# Patient Record
Sex: FEMALE | Race: WHITE | NOT HISPANIC OR LATINO | Employment: FULL TIME | URBAN - METROPOLITAN AREA
[De-identification: names, ages, dates, MRNs, and addresses within clinical notes are randomized per-mention and may not be internally consistent; named-entity substitution may affect disease eponyms.]

---
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
## 2018-01-05 ENCOUNTER — GENERIC CONVERSION - ENCOUNTER (OUTPATIENT)
Dept: OTHER | Facility: OTHER | Age: 18
End: 2018-01-05

## 2018-01-09 NOTE — PSYCH
Message  Message Free Text Note Form: received call back from guidance counselor-- spent a lot of time in her office yesterday-- has a lot of distorted thinking  feels she reaches out to friends, they do not know how to help her  they are overwhelmed  it is too much for shira-- feels dependent  claims she learned no coping skills there-- she and i both feel being in partial is the best place for her  she has already talked to mom about this  peers all have their own stuff  also not taking meds as prescribed  Active Problems    1  Abdominal pain, acute, right upper quadrant (789 01,338 19) (R10 11)   2  Acute abdominal pain in left upper quadrant (789 02,338 19) (R10 12)   3  Anxiety and depression (300 4) (F41 8)   4  Coccydynia (724 79) (M53 3)   5  Generalized anxiety disorder (300 02) (F41 1)   6  Insomnia (780 52) (G47 00)   7  Moderate mood disorder (296 90) (F39)   8  Overweight (278 02) (E66 3)   9  Panic attack (300 01) (F41 0)   10  Primary insomnia (307 42) (F51 01)    Current Meds   1  BusPIRone HCl - 5 MG Oral Tablet; TAKE 1 TABLET am, two at 1:00pm and two at   8:00pm;   Therapy: 64NML9489 to (Evaluate:03Fwq6970)  Requested for: 57YDD1164; Last   Rx:04Jun2015 Ordered   2  TraZODone HCl - 50 MG Oral Tablet; TAKE 1 TABLET AT BEDTIME; Therapy: 39ENZ7798 to (Evaluate:56Meo2628)  Requested for: 17KVU6734; Last   Rx:04Jan2016 Ordered   3  Venlafaxine HCl ER 37 5 MG Oral Capsule Extended Release 24 Hour; one P O  first   week, two P O  second week, three  P O  thereafter; Therapy: 89ZCF6348 to (Last QI:42SHU7867)  Requested for: 65DQM8739 Ordered    Allergies    1   No Known Drug Allergies    Signatures   Electronically signed by : Siddharth Thakkar LCSW; Apr 28 2016 10:07AM EST                       (Author)

## 2018-01-09 NOTE — PROGRESS NOTES
Assessment    1  Encounter for preventive health examination (V70 0) (Z00 00)   2  PPD screening test (V74 1) (Z11 1)    Plan  Anxiety and depression    · Abilify 5 MG Oral Tablet (ARIPiprazole); TAKE 1 TABLET DAILY  Anxiety and depression, Panic attack    · BusPIRone HCl - 5 MG Oral Tablet  Generalized anxiety disorder, Moderate mood disorder    · Venlafaxine HCl ER 37 5 MG Oral Capsule Extended Release 24 Hour  Health Maintenance    · Avoid alcoholic beverages ; Status:Complete;   Done: 70BDU6149   · Be sure your child gets at least 8 hours of sleep every night ; Status:Complete;   Done:  08YZF2740   · Begin or continue regular aerobic exercise  Gradually work up to at least 3 sessions of 30  minutes of exercise a week ; Status:Complete;   Done: 73DRT7948   · Brush your teeth freq1 and floss at least once a day ; Status:Complete;   Done:  22GNJ5010   · Make rules and consequences for behavior clear to your children ; Status:Complete;    Done: 05MIC8057   · Schedule an appointment in 48-72 hours to have your TB (tuberculin) skin test  interpreted by a trained healthcare provider ; Status:Complete;   Done: 10FUJ2109   · Use a sun block product with an SPF of 15 or more ; Status:Complete;   Done: 38NKA4290   · We recommend that you bring your body mass index down to 26 ; Status:Complete;    Done: 18EZC5257  Insomnia    · TraZODone HCl - 50 MG Oral Tablet  PPD screening test    · Follow-up visit in 2 days Evaluation and Treatment  Follow-up  Status: Complete  Done:  22VFJ8812   · Tubersol 5 UNIT/0 1ML Intradermal Solution    Discussion/Summary    Impression:   No elimination concerns  Growth concerns include body mass index of elevated  1500 calorie PPD  titers  Information discussed with patient  Continue COUNSELLING and BETTER CHOICES        Chief Complaint  pt here today for a PE for [place] [address] (form requires labs for titers)      History of Present Illness  HM, 12-18 years Female (Brief): Nicole Murguia presents today for routine health maintenance with her SELF   Social and birth history reviewed  Social History: She lives with her mother, 1 brothers and mother's boyfriend  Her parents are   mother has full custody  mom works outside the home  mother works as managerial    [address]: The child's health since the last visit is described as fair  Dental hygiene: Good  Immunization status: Up to date  Caregiver concerns:   Menses: Menstrual history:  age at menarche was 13  The cycles have been regular  Menstrual Problems: She does't track it  The patient is not on an oral contraceptive pill  Nutrition/Elimination:   Diet:  her current diet needs improvement:  No elimination issues are expressed  Sleep: hypersomnia  Behavior: The child's temperament is described as Pt is without parent today  Health Risks:   Childcare/School: 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  School performance has been excellent  Sports Participation Questions:   HPI: going to [place] with mom and her boyfriend, Carissa Sen and his family and the going to [facility] before coming back to [place] and then off to college      Review of Systems    Constitutional: feeling tired  Eyes: no itching of the eyes  ENT: no nosebleeds  Cardiovascular: no chest pain  Respiratory: no cough  Genitourinary: no dysmenorrhea  Musculoskeletal: no myalgias  Psychiatric: emotional problems and currently seeing a therapist  Had ER visit in March for increasing depressive symptoms:  ROS reported by the patient  ROS reviewed  Active Problems    1  Abdominal pain, acute, right upper quadrant (789 01,338 19) (R10 11)   2  Anxiety and depression (300 4) (F41 8)   3  Coccydynia (724 79) (M53 3)   4  Generalized anxiety disorder (300 02) (F41 1)   5  Insomnia (780 52) (G47 00)   6  Moderate mood disorder (296 90) (F39)   7  Need for hepatitis B screening test (V73 89) (Z11 59)   8   Overweight (278 02) (E66 3)   9  Panic disorder without agoraphobia with moderate panic attacks (300 01) (F41 0)   10  Primary insomnia (307 42) (F51 01)   11  Screening for [place] measles (V73 3) (Z11 59)   12  Screening for rubella (V73 3) (Z11 59)    Past Medical History    · History of A Fall (E888 9)   · History of Acute abdominal pain in left upper quadrant (072 58,878 62) (R10 12)   · History of Acute abdominal pain in left upper quadrant (789 02,338 19) (R10 12)   · Discussion of Acute upper respiratory infection (465 9) (J06 9)   · History of dehydration (V12 29) (Z86 39)   · History of low back pain (V13 59) (Z87 39)   · History of nausea and vomiting (V12 79) (Z87 898)   · History of Need for Menactra vaccination (V03 89) (Z23)   · History of Need for vaccination for DTP (V06 1) (Z23)   · History of Sprained Ribs (848 3)   · History of Vesicoureteral reflux (593 70) (N13 70)    Surgical History    · Denied: History of Recent Surgery    Family History  Mother    · Family history of Hyperlipidemia   · Family history of Hypothyroidism  Father    · No pertinent family history  Family History    · Family history of Diabetes Mellitus (V18 0)   · Family history of Thyroid Disorder (V18 19)    Social History    · Never A Smoker   · Never Drank Alcohol   · Other parent-child estrangement (V61 29) (R31 309)    Current Meds   1  BusPIRone HCl - 5 MG Oral Tablet; TAKE 1 TABLET am, two at 1:00pm and two at   8:00pm;   Therapy: 28QRF9921 to (Evaluate:27Ggc7169)  Requested for: 87NDJ3036; Last   Rx:04Jun2015 Ordered   2  TraZODone HCl - 50 MG Oral Tablet; TAKE 1 TABLET AT BEDTIME; Therapy: 97HMN7354 to (Evaluate:43Ewo2722)  Requested for: 58WBH1893; Last   Rx:04Jan2016 Ordered   3  Venlafaxine HCl ER 37 5 MG Oral Capsule Extended Release 24 Hour; one P O  first   week, two P O  second week, three  P O  thereafter; Therapy: 60QLG1486 to (Last OI:05QXY4011)  Requested for: 35JLJ0740 Ordered    Allergies    1   No Known Drug Allergies    Vitals   Recorded: 83Fbn1378 01:00PM Recorded: 98RMK1679 19:86KX   Systolic 463    Diastolic 68    Height  5 ft 7 in   Weight  210 lb    BMI Calculated  32 89   BSA Calculated  2 06     Physical Exam    Constitutional - General appearance: No acute distress, well appearing and well nourished  alert, well developed, obese and well hydrated  Eyes - Pupils and irises: Equal, round, reactive to light bilaterally  Ears, Nose, Mouth, and Throat - Nasal mucosa, septum, and turbinates: Normal, no edema or discharge  Oropharynx: Moist mucosa, normal tongue and tonsils without lesions  Pulmonary - Auscultation of lungs: Clear bilaterally  Cardiovascular - Auscultation of heart: Regular rate and rhythm, normal S1 and S2, no murmur  Peripheral vascular exam: Normal    Abdomen - Abdomen: Normal bowel sounds, soft, non-tender, no masses  Liver and spleen: No hepatomegaly or splenomegaly  Lymphatic - Palpation of lymph nodes in neck: No anterior or posterior cervical lymphadenopathy  Musculoskeletal - Gait and station: Normal gait  Range of motion: Normal  Muscle strength/tone: Normal    Neurologic - Sensation: Normal    Psychiatric - Orientation to person, place, and time: Normal  Mood and affect: Abnormal  Mood and Affect: constricted, flat and unemotional  ambivalent attitude about going off to college        Future Appointments    Date/Time Provider Specialty Site   08/02/2016 02:00 PM Lili Pena [address] [facility]   08/09/2016 05:00 PM Lili Pena [address] [facility]   08/15/2016 01:00 PM Lili Pena [address] [facility]   08/23/2016 05:00 PM Lili Pena, [address] [facility]   09/06/2016 05:00 PM Lili Pena [address] [facility]   09/20/2016 05:00 PM Lili Pena [address] [facility]   10/04/2016 05:00 PM Lili Pena [place]   10/18/2016 05:00 PM Gabi Pena [place]     Signatures   Electronically signed by : Coreen Jacobo DO; Jul 27 4699  4:53PM EST                       (Author)

## 2018-01-09 NOTE — PSYCH
Progress Note  Psychotherapy Provided [facility]: Individual Psychotherapy 50 minutes and Family Therapy provided today  Goals addressed in session:   1-3D: P: Pt was seen with her paramour for a Family Therapy session  Mario Whipple spoke further today about her feelings re: her lack of certainty with what to do with her future  She verbalized that she feels like she "made a mistake" disenrolling from college because "that is what 'everyone' keeps telling her "   A: Mario Whipple appears to be continuing to experience pressure from her mother to return to college despite not feeling ready to do so at this time  She presented as more overwhelmed and anxious today  P: Additional sessions will be scheduled to support her with the above  Pain Scale and Suicide Risk [facility]: Current Pain Assessment: no pain   Behavioral Health Treatment Plan [employer]: Diagnosis and Treatment Plan explained to patient, patient relates understanding diagnosis and is agreeable to Treatment Plan  Assessment    1  PTSD (post-traumatic stress disorder) (309 81) (F43 10)   2   Moderate mood disorder (296 90) (F39)    Signatures   Electronically signed by : Lis Cohen LCSW; Apr 18 2017 11:33AM EST                       (Author)

## 2018-01-10 NOTE — PSYCH
Progress Note  Psychotherapy Provided [facility]: Individual Psychotherapy 50 minutes provided today  Goals addressed in session:   1-3D: P: Pt was seen for Individual Therapy session  Mario Whipple spoke about her feelings re: her restored friendship with her ex-paramour following her decision to terminate their relationship Mario Whipple reported that she has returned (temporarily) to her job but continues to have difficulty getting out of bed if she is not scheduled to work  A: Mario Whipple is very upset at having learned that her mother is cheating on her 2rd  despite being in the middle of writing a book about the success of her marriage  She is devastated by this news, but not suicidal    P: Mario Whipple will continue to be seen weekly for therapy as she was unwilling to attend [facility]  Pain Scale and Suicide Risk [facility]: Current Pain Assessment: no pain   Current suicide risk is low   Behavioral Health Treatment Plan [facility]: Diagnosis and Treatment Plan explained to patient, patient relates understanding diagnosis and is agreeable to Treatment Plan  Assessment    1  PTSD (post-traumatic stress disorder) (309 81) (F43 10)   2   Moderate mood disorder (296 90) (F39)    Signatures   Electronically signed by : Lis Cohen LCSW; May 12 2017  4:12PM EST                       (Author)

## 2018-01-10 NOTE — PSYCH
Progress Note  Psychotherapy Provided [facility]: Individual Psychotherapy 50 minutes provided today  Goals addressed in session:   1-3D: P: Pt was seen for Individual Therapy session  Debbi Tejada spoke about her feelings re: her last session, her desire to emancipate financially from her parents, to move out of her grandparents home so as to decrease their stress levels and to make decisions about her future  A: Debbi Tejada appears to be frustrated by the pressure she is feeling from her mother to return to college despite not feeling ready to do so at this time  She presented as less hopeless and despondent today and was able to speak and think more clearly  P: Additional sessions will be scheduled to support her with the above  Pain Scale and Suicide Risk [facility]: Current Pain Assessment: no pain   Behavioral Health Treatment Plan [facility]: Diagnosis and Treatment Plan explained to patient, patient relates understanding diagnosis and is agreeable to Treatment Plan  Assessment    1  Moderate mood disorder (296 90) (F39)   2  Encounter for preventive health examination (V70 0) (Z00 00)   3   Panic disorder without agoraphobia with moderate panic attacks (300 01) (F41 0)    Signatures   Electronically signed by : [facility], [facility]; Apr 6 2017  1:42PM EST                       (Author)

## 2018-01-10 NOTE — PSYCH
Message  Message Free Text Note Form: returned call from pt --she stated that she is at work--"has not eaten in two days, has not slept in two days, has not urinated " Reported that she took 12 of mom's antidepressants with alcohol, informed friend who "watched her throughout the night  Pt denied suicidal intent  Was told she should go to ER and to call mom (who is out of town) Pt refused both, but will remain with friend for next 2 days  Active Problems    1  Chronic fatigue (780 79) (R53 82)   2  Dyshidrotic eczema (705 81) (L30 1)   3  Moderate mood disorder (296 90) (F39)   4  PPD screening test (V74 1) (Z11 1)   5  Primary insomnia (307 42) (F51 01)   6  PTSD (post-traumatic stress disorder) (309 81) (F43 10)   7  Screening for [place] measles (V73 3) (Z11 59)   8  Screening for rubella (V73 3) (Z11 59)    Current Meds   1  ARIPiprazole 5 MG Oral Tablet; take 1 tablet by mouth every day; Therapy: 43LER1738 to (Evaluate:22Jun2017)  Requested for: 13FQO9208; Last   Rx:36Ugy2424 Ordered   2  Hydrocortisone 2 5 % External Cream; APPLY SPARINGLY TO AFFECTED AREA(S)   TWICE DAILY; Therapy: 84Dcn1797 to (Evaluate:17Jun2017)  Requested for: 18Apr2017; Last   Rx:50Mdo4462 Ordered    Allergies    1   No Known Drug Allergies    Signatures   Electronically signed by : Paco Funk LCSW; Jun 16 2017 10:52AM EST                       (Author)

## 2018-01-10 NOTE — MISCELLANEOUS
Message  Message Free Text Note Form: P/C from [facility] ER  Pt with Hx of anxiety and depression recently D/C'd abilify  Overdose on welbutrin and ETOH yesterday  Not suicidal  Will be D/C'd home  Needs close f/u        Signatures   Electronically signed by : Kulwant Rodriguez DO; Jun 16 2017 11:03PM EST                       (Author)

## 2018-01-10 NOTE — PSYCH
Progress Note  Psychotherapy Provided [facility]: Group Therapy provided today  Goals addressed in session:   1 D: Pt attended Trauma Group  Introductions, group rules and expectations were reviewed as several new members attended  Pts engaged in activity today re: fear of change  A: Robinson Paniagua was quiet and observant today as this was her first time attending this group  She expressed that she could very much identify with the topic being discussed today as she has âlostâ all of the friends she had felt close to throughout high school  P: Pt will continue to be seen for individual and group therapy during which the above will be further processed  Behavioral Health Treatment Plan [address] [place] [address] [address]: Diagnosis and Treatment Plan explained to patient, patient relates understanding diagnosis and is agreeable to Treatment Plan  Assessment    1   Panic disorder without agoraphobia with moderate panic attacks (300 01) (F41 0)    Signatures   Electronically signed by : Dayanara Zaragoza LCSW; Jul 14 2016  8:44AM EST                       (Author)

## 2018-01-10 NOTE — PSYCH
Risk Assessment    The following ratings are based on my observation of this patient over the last interview  Risk of Harm to Self:   Demographic risk factors include , alaskan, or native [place] and age: young adult (15-24)  Recent Specific Risk Factors include: experienced persistent ideation, unable to visualize a realistic positive future, feelings of guilt or self blame, recent losses: friendships and diagnosis of depression  These risk factors presented within the last week  Additional Factors for a Child or Adolescent: strained family relationships/ or  parents  Risk of Harm to Others:   Based on the above information, the client presents the following risk of harm to self or others: moderate  The following interventions are recommended: referral to ER  Notes regarding this Risk Assessment: Pt was fairly non-verbal during today's session as mom's presence was requested by this worker  As a result of this worker's concerns, pt and family were sent directly to ER following this session for further evaluation  Assessment    1  Major depressive disorder, single episode (296 20) (F32 9)   2  Generalized anxiety disorder (300 02) (F41 1)    Active Problems    1  Abdominal pain, acute, right upper quadrant (789 01,338 19) (R10 11)   2  Acute abdominal pain in left upper quadrant (789 02,338 19) (R10 12)   3  Anxiety and depression (300 4) (F41 8)   4  Coccydynia (724 79) (M53 3)   5  Generalized anxiety disorder (300 02) (F41 1)   6  Insomnia (780 52) (G47 00)   7  Major depressive disorder, single episode (296 20) (F32 9)   8  Overweight (278 02) (E66 3)   9  Panic attack (300 01) (F41 0)   10  Primary insomnia (307 42) (F51 01)    Past Medical History    1  History of A Fall (E888 9)   2  Discussion of Acute upper respiratory infection (465 9) (J06 9)   3  History of dehydration (V12 29) (Z86 39)   4  History of low back pain (V13 59) (Z87 39)   5   History of nausea and vomiting (V12 79) (Z87 898)   6  History of Need for Menactra vaccination (V03 89) (Z23)   7  History of Need for vaccination for DTP (V06 1) (Z23)   8  History of Sprained Ribs (848 3)   9   History of Vesicoureteral reflux (593 70) (N13 70)    Future Appointments    Date/Time Provider Specialty Site   03/22/2016 08:00 AM Lili Pena [address] [facility]   [facility]12[facility]2016 03[facility]     Signatures   Electronically signed by : Renato Johnson LCSW; Mar 17 2016  2:01PM EST                       (Author)

## 2018-01-10 NOTE — PSYCH
Progress Note  Psychotherapy Provided [facility]: Individual Psychotherapy 50 minutes provided today  Goals addressed in session:   1-3D: P: Pt was seen for Individual Therapy session  Pt shared a letter she wrote to this worker about her feelings re: her trauma history and relationships with her friends and how they have continued to change as the end of high school approaches  She also shared texts from her father  A: Mary Morales appears to feel uncertain as to whether her trauma memories are "real" as they occurred during a very chaotic time in her childhood      P: Additional sessions were scheduled during which the above will continue to be addressed  Pain Scale and Suicide Risk [facility]: Current Pain Assessment: no pain   Current suicide risk is low   Behavioral Health Treatment Plan [facility]: Diagnosis and Treatment Plan explained to patient, patient relates understanding diagnosis and is agreeable to Treatment Plan  Assessment    1  Moderate mood disorder (296 90) (F39)   2   Acute abdominal pain in left upper quadrant (610 52,296 52) (R10 12)    Signatures   Electronically signed by : Kerin Carrel, LCSW; May 19 2016  9:01AM EST                       (Author)

## 2018-01-11 NOTE — PSYCH
Progress Note  Psychotherapy Provided [facility]: Individual Psychotherapy 50 minutes provided today  Goals addressed in session:   1-3D: P: Pt was seen for Individual Therapy session  Pt spoke about her feelings of despondency as she admitted to this worker that she realized this year she is hendrix and is attracted to her best friend  However, she does not believe she she could ever be out to her family, nor is this acceptable in her [address]  A: Phillip Pedroza struggles with not believing that she can ever be happy regardless of her sexuality or acceptance by others  This will continue to be the focus, topic of upcoming therapy sessions as she prepares for dpearture for college in 4 months  P: Additional sessions were scheduled during which the above will continue to be addressed  Pain Scale and Suicide Risk [facility]: Current Pain Assessment: no pain   Current suicide risk is low   Behavioral Health Treatment Plan [facility]: Diagnosis and Treatment Plan explained to patient, patient relates understanding diagnosis and is agreeable to Treatment Plan  Assessment    1  Moderate mood disorder (296 90) (F39)   2   Generalized anxiety disorder (300 02) (F41 1)    Signatures   Electronically signed by : Elena Lord LCSW; Apr 19 2016  2:57PM EST                       (Author)

## 2018-01-11 NOTE — PSYCH
Message  Message Free Text Note Form: returned call from mom - she is upset that Jordan Costa is refusing to take meds  Reported that her moods are "down for 4 days, up for one " Shared that daughter "overdosed" on pot brownies and pcp was contacted  Stated she has "cut her off financially," and will not send her to college in current state  However, admitted she is allowing her to leave following today's session to go [place] work friends [place] a [place]  Informed mom all would be discussed and additional sessions would be scheduled  Active Problems    1  Abdominal pain, acute, right upper quadrant (789 01,338 19) (R10 11)   2  Anxiety and depression (300 4) (F41 8)   3  Coccydynia (724 79) (M53 3)   4  Generalized anxiety disorder (300 02) (F41 1)   5  Insomnia (780 52) (G47 00)   6  Moderate mood disorder (296 90) (F39)   7  Overweight (278 02) (E66 3)   8  Panic disorder without agoraphobia with moderate panic attacks (300 01) (F41 0)   9  Primary insomnia (307 42) (F51 01)    Current Meds   1  BusPIRone HCl - 5 MG Oral Tablet; TAKE 1 TABLET am, two at 1:00pm and two at   8:00pm;   Therapy: 36IRY9380 to (Evaluate:90Cxu8658)  Requested for: 80PRU7460; Last   Rx:04Jun2015 Ordered   2  TraZODone HCl - 50 MG Oral Tablet; TAKE 1 TABLET AT BEDTIME; Therapy: 36BTP1375 to (Evaluate:38Bqe7217)  Requested for: 22EYO6081; Last   Rx:04Jan2016 Ordered   3  Venlafaxine HCl ER 37 5 MG Oral Capsule Extended Release 24 Hour; one P O  first   week, two P O  second week, three  P O  thereafter; Therapy: 59VOZ5369 to (Last NB:05OFE3174)  Requested for: 74VHE9751 Ordered    Allergies    1   No Known Drug Allergies    Signatures   Electronically signed by : Nova Arreola LCSW; Jul 13 2016  9:59AM EST                       (Author)

## 2018-01-11 NOTE — PSYCH
Progress Note  Psychotherapy Provided [facility]: Individual Psychotherapy 50 minutes provided today  Goals addressed in session:   1-3D: P: Pt was seen for Individual Therapy session  Pt spoke about her more today re: her feelings re: her relationships with her friends and how they have continued to change as the end of high school approaches  She also shared the fears she has about her future  A: Jessica Rowell appears to working at, although still struggling to stay in the present without overthinking situations negatively Her friends appear to recognize the attempts she is making to change  P: Additional sessions were scheduled during which the above will continue to be addressed  Pain Scale and Suicide Risk [facility]: Current Pain Assessment: no pain   Current suicide risk is low   Behavioral Health Treatment Plan [facility]: Diagnosis and Treatment Plan explained to patient, patient relates understanding diagnosis and is agreeable to Treatment Plan  Assessment    1  Moderate mood disorder (296 90) (F39)   2   Generalized anxiety disorder (300 02) (F41 1)    Signatures   Electronically signed by : Mecca Quintero LCSW; May 24 2016 12:28PM EST                       (Author)

## 2018-01-11 NOTE — PSYCH
Message  Message Free Text Note Form: Mom cb after receiving my msg-- feels like Lita Thorpe exaggerates things that have happened in her life-- that they had a great weekend, she will NOT tell her family anything! Mom asks her ALL the time if she is going to hurt herself - she says she would NEVER do that-- puts a tracker in her phone and she RIPS it out-- Jewel Underwood told her to NOT take her car  Mom asked --should she go to a hospital? Yes, I do think she should be evaluated-- she will discuss with her parents    Is this a cry for attention? If it is, there is a NEED for attention  Offered to see family in my emergency appt on Thurs  Mom has to check her schedule  Sofia Chapin Mom will supervise Dara at all times, will take her car tfn  WCB      Active Problems    1  Abdominal pain, acute, right upper quadrant (789 01,338 19) (R10 11)   2  Acute abdominal pain in left upper quadrant (789 02,338 19) (R10 12)   3  Anxiety and depression (300 4) (F41 8)   4  Coccydynia (724 79) (M53 3)   5  Generalized anxiety disorder (300 02) (F41 1)   6  Insomnia (780 52) (G47 00)   7  Major depressive disorder, single episode (296 20) (F32 9)   8  Overweight (278 02) (E66 3)   9  Panic attack (300 01) (F41 0)   10  Primary insomnia (307 42) (F51 01)    Current Meds   1  BusPIRone HCl - 5 MG Oral Tablet; TAKE 1 TABLET am, two at 1:00pm and two at   8:00pm;   Therapy: 78LKP9151 to (Evaluate:28Sin1115)  Requested for: 21EZM6473; Last   Rx:04Jun2015 Ordered   2  TraZODone HCl - 50 MG Oral Tablet; TAKE 1 TABLET AT BEDTIME; Therapy: 69KCO4766 to (Evaluate:00Yhm4584)  Requested for: 26FOL6615; Last   Rx:04Jan2016 Ordered   3  Venlafaxine HCl ER 37 5 MG Oral Capsule Extended Release 24 Hour; one P O  first   week, two P O  second week, three  P O  thereafter; Therapy: 54PEY0471 to (Last SI:21WJX9775)  Requested for: 61ZAK7748 Ordered    Allergies    1   No Known Drug Allergies    Signatures   Electronically signed by : Yoni Burnett LCSW; Mar 15 2016  2:11PM EST                       (Author)

## 2018-01-12 NOTE — PSYCH
Progress Note  Psychotherapy Provided [facility]: Individual Psychotherapy 50 minutes provided today  Goals addressed in session:   1-3D: P: Pt was seen for Individual Therapy session  Adriana Mills spoke much more about her feelings re: her restored friendship with her ex-paramour  Adriana Mills reported that she is aware that this girl "is not hendrix," and would prefer her to be "a male " This was explored at length, as was her father's decision to hire a  for her  A: Adriana Mills was receptive to support and feedback today as she spoke of the above  She has enrolled at [employer] for the Fall, and 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  P: Adriana Mills will continue to be seen weekly for therapy as she was unwilling to attend [facility]  Pain Scale and Suicide Risk [facility]: Current Pain Assessment: no pain   Current suicide risk is low   Behavioral Health Treatment Plan [facility]: Diagnosis and Treatment Plan explained to patient, patient relates understanding diagnosis and is agreeable to Treatment Plan  Assessment    1  PTSD (post-traumatic stress disorder) (309 81) (F43 10)   2   Moderate mood disorder (296 90) (F39)    Signatures   Electronically signed by : Lindon Caller, LCSW; Jun 6 2017  9:58AM EST                       (Author)

## 2018-01-12 NOTE — PSYCH
Progress Note  Psychotherapy Provided [facility]: Family Therapy provided today  Goals addressed in session:   1-3D: P: Pt was seen for Family Therapy session  Pt spoke more about her feelings re: the loss of support from her two best friends over the past several months  Her family history of invalidation was also briefly discussed  This all occurred in the presence of on of her best friends  A: Pt was very open today  She admitted that she is very hard on herself and does not belief that it is acceptable that her trauma history should impact her ability to have healthy relationships  P: Additional sessions were scheduled during which the above will begin to be addressed  Behavioral Health Treatment Plan [facility]: Diagnosis and Treatment Plan explained to patient, patient relates understanding diagnosis and is agreeable to Treatment Plan  Assessment    1  Clinical depression (311) (F32 9)   2  Generalized anxiety disorder (300 02) (F41 1)   3   Panic attack (300 01) (F41 0)    Signatures   Electronically signed by : Lis Cohen LCSW; Feb 26 2016 11:50AM EST                       (Author)

## 2018-01-12 NOTE — PSYCH
Progress Note  Psychotherapy Provided [facility]: Individual Psychotherapy 50 minutes provided today  Goals addressed in session:   1-3D: P: Pt was seen for Individual Therapy session  Pt spoke more about her feelings re: the loss of support from her two best friends over the past several months  Her family history of invalidation was also briefly discussed  A: Pt was very open today  She admitted she became overwhelmed on Friday and punched a hole in her wall  Her mother was unsupportive of her sadness  P: Additional sessions were scheduled during which the above will begin to be addressed  Behavioral Health Treatment Plan [facility]: Diagnosis and Treatment Plan explained to patient, patient relates understanding diagnosis and is agreeable to Treatment Plan  Assessment    1  Generalized anxiety disorder (300 02) (F41 1)   2  Panic attack (300 01) (F41 0)   3   Clinical depression (311) (F32 9)    Signatures   Electronically signed by : Elena Lord LCSW; Feb 18 2016  9:32AM EST                       (Author)

## 2018-01-12 NOTE — PSYCH
Date of Initial Treatment Plan: 2/5/2016  Date of Current Treatment Plan: 3/2/17  Treatment Plan 4  Strengths/Personal Resources for Self Care: I am productive, an achiever, knowledgeable, a leader, a , forgiving, understanding, compassionate, accomplished, analytical, tolerant, unselfish, naive, loyal, funny, intense, changeable, intelligent, a facilitator, open-minded, sensitive, sincere, self-reliant, a good listener, and perceptive  Diagnosis:   Axis I: MDD     Area of Needs: I don't want to be underneath my family's thumb  I believe that if I don't have control over everything, I don't have control over anything  I don't know what I want to do with my life  Long Term Goals:   I want to commit to goals and follow through on them  (I want to be more consistent)   Target Date: 7/2/17      I want to be independent  Target Date: 7/2/17      I want to be strong enough to handle bad situations without becoming paralyzed by them   Target Date: 7/2/17    Short Term Objectives:   Goal 1:   I will work on my past    Target Date: 6/2/17      Goal 2:   I will focus more on myself and less on others  I will set short term, obtainable goals  Target Date: 6/2/17      Goal 3:   I will learn to challenge my thinking  Target Date: 7/2/17      GOAL 1: Modality: Individual 2 x per month Target Date: 7/2/17       The person(s) responsible for carrying out the plan is Jordan Costa  GOAL 2: Modality: Individual 2 x per month Target Date: 7/2/17       The person(s) responsible for carrying out the plan is Jordan Costa  GOAL 3: Modality: Individual 2 x per month Target Date: 7/2/17         The person(s) responsible for carrying out the plan is Jordan Costa  The first scheduled review date is 7/2/17  The expected length of service is 6 mos               CLIENT COMMENTS / Please share your thoughts, feelings, need and/or experiences regarding your treatment plan: _____________________________________________________________________________________________________________________________________________________________________________________________________________________________________________________________________________________________________________________ Date/Time: ______________     Patient Signature: _________________________________ Date/Time: ______________        1  Abdominal pain, acute, right upper quadrant (789 01,338 19) (R10 11)   2  Anxiety and depression (300 4) (F41 8)   3  Coccydynia (724 79) (M53 3)   4  Generalized anxiety disorder (300 02) (F41 1)   5  Insomnia (780 52) (G47 00)   6  Moderate mood disorder (296 90) (F39)   7  Need for hepatitis B screening test (V73 89) (Z11 59)   8  Overweight (278 02) (E66 3)   9  Panic disorder without agoraphobia with moderate panic attacks (300 01) (F41 0)   10  PPD screening test (V74 1) (Z11 1)   11  Primary insomnia (307 42) (F51 01)   12  Screening for [place] measles (V73 3) (Z11 59)   13   Screening for rubella (V73 3) (Z11 59)     Electronically signed by : Ricki Salcedo LCSW; Mar  2 2017  3:40PM EST                       (Author)

## 2018-01-12 NOTE — PSYCH
Progress Note  Psychotherapy Provided [facility]: Individual Psychotherapy 50 minutes provided today  Goals addressed in session:   1-3D: P: Pt was seen for Individual Therapy session  Felipe Portillo reported that she has returned home permanently from college as she became overwhelmed by her struggles in completing assignments  She shared how negatively her family has been about this decision resulting in her moving in with her grandmother  A: Felipe Portillo appears to be very concerned that people may think she returned home to "be with her girlfriend" and she is extremely upset about this  She does not "know what she wants to do with her life" and "needs assistance figuring this out "   P: Additional sessions will be scheduled to support her with the above  Pain Scale and Suicide Risk [facility]: Current Pain Assessment: no pain   Behavioral Health Treatment Plan [address] [place] [address] [address]: Diagnosis and Treatment Plan explained to patient, patient relates understanding diagnosis and is agreeable to Treatment Plan  Results/Data  PHQ-9 Adult Depression Screening [id number] 10:05AM [place]     Test Name Result Flag Reference   PHQ-9 Adult Depression Score 10     Over the last two weeks, how often have you been bothered by any of the following problems? Little interest or pleasure in doing things: Several days - 1  Feeling down, depressed, or hopeless: Not at all - 0  Trouble falling or staying asleep, or sleeping too much: More than half the days - 2  Feeling tired or having little energy: Nearly every day - 3  Poor appetite or over eating: Several days - 1  Feeling bad about yourself - or that you are a failure or have let yourself or your family down: Nearly every day - 3  Trouble concentrating on things, such as reading the newspaper or watching television: Not at all - 0  Moving or speaking so slowly that other people could have noticed   Or the opposite -  being so fidgety or restless that you have been moving around a lot more than usual: Not at all - 0  Thoughts that you would be better off dead, or of hurting yourself in some way: Not at all - 0   PHQ-9 Adult Depression Screening Negative     PHQ-9 Difficulty Level Somewhat difficult     PHQ-9 Severity Moderate Depression         Assessment    1  Generalized anxiety disorder (300 02) (F41 1)   2  Moderate mood disorder (296 90) (F39)   3   Panic disorder without agoraphobia with moderate panic attacks (300 01) (F41 0)    Signatures   Electronically signed by : Mita Rodgers LCSW; Mar  3 2017 11:04AM EST                       (Author)
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
## 2018-01-13 NOTE — PSYCH
Progress Note  Psychotherapy Provided [facility]: Individual Psychotherapy 50 minutes provided today  Goals addressed in session:   1-3D: P: Pt was seen for Individual Therapy session  Pt spoke today re: her feelings re: her plans to leave for college in a few weeks andd ongoing belief that her trauma and depressive symptoms are "all in her head" as that is what she has always been told by her father  A: Robinson Paniagua appears to benefit from her reltaionship with this worker and is not looking forward to leaving for school  She was encouraged to share these feelings with her mother  P: Additional sessions were scheduled during which the above will continue to be addressed  Pain Scale and Suicide Risk [facility]: Current Pain Assessment: no pain   Current suicide risk is low   Behavioral Health Treatment Plan [facility]: Diagnosis and Treatment Plan explained to patient, patient relates understanding diagnosis and is agreeable to Treatment Plan  Assessment    1  Generalized anxiety disorder (300 02) (F41 1)   2  Moderate mood disorder (296 90) (F39)   3  Panic disorder without agoraphobia with moderate panic attacks (300 01) (F41 0)   4   Overweight (278 02) (E66 3)    Signatures   Electronically signed by : Dayanara Zaragoza LCSW; Jul 26 2016 11:58AM EST                       (Author)

## 2018-01-13 NOTE — PSYCH
Message  Message Free Text Note Form: called dr Montalvo Organ re: pt-- expressed concern for her wellbeing--  has had recent contact with her and will reach out to pt      called pt to request that she reach out to   --she fwd call to  4x  Active Problems    1  Chronic fatigue (780 79) (R53 82)   2  Dyshidrotic eczema (705 81) (L30 1)   3  Moderate mood disorder (296 90) (F39)   4  PPD screening test (V74 1) (Z11 1)   5  Primary insomnia (307 42) (F51 01)   6  PTSD (post-traumatic stress disorder) (309 81) (F43 10)   7  Screening for [place] measles (V73 3) (Z11 59)   8  Screening for rubella (V73 3) (Z11 59)    Current Meds   1  ARIPiprazole 5 MG Oral Tablet; take 1 tablet by mouth every day; Therapy: 17CVH3240 to (Evaluate:22Jun2017)  Requested for: 38QBA5060; Last   Rx:11Qsy3459 Ordered   2  Hydrocortisone 2 5 % External Cream; APPLY SPARINGLY TO AFFECTED AREA(S)   TWICE DAILY; Therapy: 87Wlx3000 to (Evaluate:17Jun2017)  Requested for: 79Msw2450; Last   Rx:08Umv3593 Ordered    Allergies    1   No Known Drug Allergies    Signatures   Electronically signed by : Oj Alfaro LCSW; Jun 16 2017  5:06PM EST                       (Author)

## 2018-01-14 VITALS
DIASTOLIC BLOOD PRESSURE: 70 MMHG | WEIGHT: 232.38 LBS | HEIGHT: 67 IN | BODY MASS INDEX: 36.47 KG/M2 | SYSTOLIC BLOOD PRESSURE: 118 MMHG

## 2018-01-14 VITALS
WEIGHT: 222.13 LBS | HEIGHT: 67 IN | SYSTOLIC BLOOD PRESSURE: 112 MMHG | DIASTOLIC BLOOD PRESSURE: 68 MMHG | BODY MASS INDEX: 34.87 KG/M2

## 2018-01-14 NOTE — PSYCH
1  Major depressive disorder, single episode (296 20) (F32 9)   2  Generalized anxiety disorder (300 02) (F41 1)      Date of Initial Treatment Plan: 2/5/2016  Date of Current Treatment Plan: 3/14/2016  Treatment Plan 2  Strengths/Personal Resources for Self Care: I am productive, an achiever, knowledgeable, a leader, a , forgiving, understanding, compassionate, accomplished, analytical, tolerant, unselfish, naive, loyal, funny, intense, changeable, intelligent, a facilitator, open-minded, sensitive, sincere, self-reliant, a good listener, and perceptive  Diagnosis:   Axis I: MDD     Current Challenges/Problems/Needs: I over think everything  I dwell on things- I struggle with committing to things- it scares me  I am my toughest critic  Nothing is ever good enough  I am a big time perfectionist  I don't / can't trust be  Long Term Goals:   I want to be more trusting  Target Date: 6/5/2016      I want to be accept as myself so I can be accepted by other people  Target Date: 6/5/2016      I want my happiness to be dependent on me   Target Date: 6/5/2016      Short Term Objectives:   Goal 1:   I will take people's word --initially at face value instead of overthinking their intentions  I will talk in therapy about my current struggles to trust    Target Date: 5/5/2016      Goal 2:   I will practice expressing myself to people more openly  Target Date: 5/5/2016      Goal 3:   I will do thought stopping   I will do yoga  I will set and follow through with daily goals  I will learn to challenge my thinking  Target Date: 5/5/2016      GOAL 1: Modality: Individual 2 x per month Target Date: 6/5/2016         GOAL 2: Modality: Individual 2 x per month Target Date: 6/5/2016         GOAL 3: Modality: Individual 2 x per month Target Date: 6/5/2016         The first scheduled review date is 6/5/2016  The expected length of service is 6 mos                      CLIENT COMMENTS / Please share your thoughts, feelings, need and/or experiences regarding your treatment plan: _____________________________________________________________________________________________________________________________________________________________________________________________________________________________________________________________________________________________________________________ Date/Time: ______________     Patient Signature: _________________________________ Date/Time: ______________        1  Abdominal pain, acute, right upper quadrant (789 01,338 19) (R10 11)   2  Acute abdominal pain in left upper quadrant (789 02,338 19) (R10 12)   3  Anxiety and depression (300 4) (F41 8)   4  Coccydynia (724 79) (M53 3)   5  Generalized anxiety disorder (300 02) (F41 1)   6  Insomnia (780 52) (G47 00)   7  Major depressive disorder, single episode (296 20) (F32 9)   8  Overweight (278 02) (E66 3)   9  Panic attack (300 01) (F41 0)   10   Primary insomnia (307 42) (F51 01)     Electronically signed by : Linda Pruitt LCSW; Mar 14 2016  3:53PM EST                       (Author)

## 2018-01-14 NOTE — PSYCH
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, but has struggled less with depression and mood swings  She is receiving therapy regularly at school and benefitting from doing so  P: Additional sessions will be scheduled during semester breaks  Pain Scale and Suicide Risk [facility]: Current Pain Assessment: no pain   Behavioral Health Treatment Plan [facility]: Diagnosis and Treatment Plan explained to patient, patient relates understanding diagnosis and is agreeable to Treatment Plan  Assessment    1  Generalized anxiety disorder (300 02) (F41 1)   2  Panic disorder without agoraphobia with moderate panic attacks (300 01) (F41 0)   3   Moderate mood disorder (296 90) (F39)    Signatures   Electronically signed by : Eryn Joyce LCSW; Feb 13 2017  1:02PM EST                       (Author)    Electronically signed by : Gordo Gale; Feb 13 2017  1:02PM EST                       (Author)

## 2018-01-14 NOTE — PSYCH
Progress Note  Psychotherapy Provided [facility]: Individual Psychotherapy 50 minutes provided today  Goals addressed in session:   1-3D: P: Pt was seen for Individual Therapy session  Treatment Plan was developed  A: Pt was very open and insightful despite initial hesitancy to attend, participate  P: Additional sessions were scheduled during which the goals will begin to be addressed  Assessment    1  Clinical depression (311) (F32 9)   2   Generalized anxiety disorder (300 02) (F41 1)    Signatures   Electronically signed by : Kori Woody LCSW; Feb 5 2016  9:57AM EST                       (Author)

## 2018-01-14 NOTE — PSYCH
Progress Note  Psychotherapy Provided [facility]: Individual Psychotherapy 50 minutes provided today  Goals addressed in session:   1-3D: P: Pt was seen for Individual Therapy session  Pt spoke about her feelings re: having begun to open up to family members about her sexuality  She also shared her desire to share with her friends, but stated uncertainty as to whether to do so prior to the end of the school year  A: Phillip Pedroza appears to feel fairly supported by her mother at this time  She continues to rely heavily on the reactions and statements of others to determine her own emotional well-being, however  P: Additional sessions were scheduled during which the above will continue to be addressed  Pain Scale and Suicide Risk [facility]: Current Pain Assessment: no pain   Current suicide risk is low   Behavioral Health Treatment Plan [facility]: Diagnosis and Treatment Plan explained to patient, patient relates understanding diagnosis and is agreeable to Treatment Plan  Assessment    1  Moderate mood disorder (296 90) (F39)   2   Generalized anxiety disorder (300 02) (F41 1)    Signatures   Electronically signed by : Elena Lord LCSW; May  4 2016 10:26AM EST                       (Author)

## 2018-01-15 NOTE — PSYCH
Progress Note  Psychotherapy Provided [facility]: Individual Psychotherapy 50 minutes provided today  Goals addressed in session:   1-3D: P: Pt was seen for Individual Therapy session  Thais Christian reluctantly spoke about her feelings re: the termination of all contact with her ex-paramour at this time  Thais Christian reported that she is home alone at present as her family is out of town  She shared texts between herself and her father and reported that she was hurt that he got rid of her bed which indicated to her he had "no place for her"   A: Thais Christian struggles with relationships and appears to sabotage relationships with anyone who attempts to get close to her  She denied suicidality today when questioned by this worker but did acknowledge marijuana use  P: Thais Christian remains unwilling to attend [facility] and has no sessions scheduled at this time as 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 but will remain on this worker's cancellation list    Pain Scale and Suicide Risk [facility]: Current Pain Assessment: no pain   Current suicide risk is low   Behavioral Health Treatment Plan [facility]: Diagnosis and Treatment Plan explained to patient, patient relates understanding diagnosis and is agreeable to Treatment Plan  Assessment    1  PTSD (post-traumatic stress disorder) (309 81) (F43 10)   2   Moderate mood disorder (296 90) (F39)    Signatures   Electronically signed by : Mita Rodgers LCSW; Justen 15 2017 10:11AM EST                       (Author)    Electronically signed by : Mita Rodgers LCSW; Justen 15 2017 11:05AM EST                       (Author)

## 2018-01-15 NOTE — PSYCH
Risk Assessment    The following ratings are based on my observation of this patient over the last session  Risk of Harm to Self:   Demographic risk factors include , alaskan, or native [place] and age: young adult (15-24)  Recent Specific Risk Factors include: experienced fleeting ideation, sense of hopelessness/helplessness, unable to visualize a realistic positive future and diagnosis of depression  These risk factors presented within the last week  Additional Factors for a Child or Adolescent: gender: female (more likely to attempt) and strained family relationships/ or  parents  Risk of Harm to Others:   Recent Specific Risk Factors include: concomitant mood or thought disorder  Access to Weapons: The patient has access to the following weapons: car  Based on the above information, the client presents the following risk of harm to self or others: moderate  The following interventions are recommended: consultation with follow up session within 24 hours  Notes regarding this Risk Assessment: Pt denied urges, intent, and plan  Pt was encouraged to bring mom (although unsupportive of therapy and in denial of daughter's need for treatment) to tomorrow's session  Assessment    1  Major depressive disorder, single episode (296 20) (F32 9)   2  Generalized anxiety disorder (300 02) (F41 1)    Active Problems    1  Abdominal pain, acute, right upper quadrant (789 01,338 19) (R10 11)   2  Acute abdominal pain in left upper quadrant (789 02,338 19) (R10 12)   3  Anxiety and depression (300 4) (F41 8)   4  Coccydynia (724 79) (M53 3)   5  Generalized anxiety disorder (300 02) (F41 1)   6  Insomnia (780 52) (G47 00)   7  Major depressive disorder, single episode (296 20) (F32 9)   8  Overweight (278 02) (E66 3)   9  Panic attack (300 01) (F41 0)   10  Primary insomnia (307 42) (F51 01)    Past Medical History    1  History of A Fall (E888 9)   2   Discussion of Acute upper respiratory infection (465 9) (J06 9)   3  History of dehydration (V12 29) (Z86 39)   4  History of low back pain (V13 59) (Z87 39)   5  History of nausea and vomiting (V12 79) (Z87 898)   6  History of Need for Menactra vaccination (V03 89) (Z23)   7  History of Need for vaccination for DTP (V06 1) (Z23)   8  History of Sprained Ribs (848 3)   9   History of Vesicoureteral reflux (593 70) (N13 70)    Future Appointments    Date/Time Provider Specialty Site   03/15/2016 02:00 PM Lili Pena [address] [facility]   03/22/2016 08:00 AM Lili Pena [address] [facility]   04/12/2016 03:00 PM Lili Pena [facility]     Signatures   Electronically signed by : Renato Johnson LCSW; Mar 14 2016  3:05PM EST                       (Author)

## 2018-01-15 NOTE — PSYCH
Progress Note  Psychotherapy Provided [facility]: Individual Psychotherapy 50 minutes provided today  Goals addressed in session:   1-3D: P: Pt was seen for Individual Therapy session  Pt spoke about her feelings of confusion re: her sexuality  She also shared texts from her father and best friend demonstrating their lack of support, understanding of what she is going through  A: Sallie Bonilla appears to feel very supported by her mother at this time  APHQ-9 was completed and a considerable decrease in score was noted today  P: Additional sessions were scheduled during which the above will continue to be addressed  Pain Scale and Suicide Risk [facility]: Current Pain Assessment: no pain   Current suicide risk is low   Behavioral Health Treatment Plan [facility]: Diagnosis and Treatment Plan explained to patient, patient relates understanding diagnosis and is agreeable to Treatment Plan  Results/Data  Encounter Results   PHQ-9 Adolescent Depression Screening 22Apr2016 01:55PM Kavita Longoria     Test Name Result Flag Reference   PHQ-9 Adolescent Depression Score 10     Q1: 1, Q2: 2, Q3: 0, Q4: 1, Q5: 0, Q6: 2, Q7: 1, Q8: 2, Q9: 1   PHQ-9 Adolescent Depression Screening Negative     PHQ-9 Difficulty Level Somewhat difficult     PHQ-9 Severity Moderate Depression         Assessment    1  Generalized anxiety disorder (300 02) (F41 1)   2   Moderate mood disorder (296 90) (F39)    Signatures   Electronically signed by : Monica Talbert LCSW; Apr 22 2016  2:51PM EST                       (Author)

## 2018-01-15 NOTE — PSYCH
Progress Note  Psychotherapy Provided [facility]: Individual Psychotherapy 50 minutes provided today  Goals addressed in session:   1-3D: P: Pt was seen for Individual Therapy session  Pt spoke more today re: her feelings re: her relationships with her friends and how they have continued to change as the end of high school has now passed  She also shared the fears she has as she has revealed her sexuality to them  A: Janae Kothari appears to be most upset that her mother was initially supportive of her coming out, but has "pulled back" her support at this time Her friends appear to have continued to remain self - involved which is upsetting to Janae Kothari  P: Additional sessions were scheduled during which the above will continue to be addressed  Pain Scale and Suicide Risk [facility]: Current Pain Assessment: no pain   Current suicide risk is low   Behavioral Health Treatment Plan [facility]: Diagnosis and Treatment Plan explained to patient, patient relates understanding diagnosis and is agreeable to Treatment Plan  Assessment    1  Panic disorder without agoraphobia with moderate panic attacks (300 01) (F41 0)   2   Moderate mood disorder (296 90) (F39)    Signatures   Electronically signed by : Rajiv Gipson LCSW; Jun 8 2016 10:55AM EST                       (Author)

## 2018-01-15 NOTE — PSYCH
Progress Note  Psychotherapy Provided [facility]: Individual Psychotherapy 50 minutes provided today  Goals addressed in session:   1-3D: P: Pt was seen for Individual Therapy session  Pt spoke about her feelings of depression and loneliness today particularly as her her two best friends have developed new friendships during this school year  over the past several months  Her family history of invalidation was also further discussed  A: Pt continues to report that her mother would be unsupportive of her sadness if she were aware, so she does not share with her  PHQ-9 and Risk Assessment were completed  P: Additional sessions were scheduled during which the above will begin to be addressed  Pain Scale and Suicide Risk [facility]: Current Pain Assessment: no pain   Current suicide risk is low   Behavioral Health Treatment Plan [address] [place] [address] [address]: Diagnosis and Treatment Plan explained to patient, patient relates understanding diagnosis and is agreeable to Treatment Plan  Results/Data  Encounter Results   PHQ-9 Adolescent Depression Screening [id number] 03:06PM Ainsley Barker     Test Name Result Flag Reference   PHQ-9 Adolescent Depression Score 19     Q1: 2, Q2: 3, Q3: 3, Q4: 3, Q5: 1, Q6: 3, Q7: 2, Q8: 1, Q9: 1   PHQ-9 Adolescent Depression Screening Positive     PHQ-9 Difficulty Level Extremely difficult     PHQ-9 Severity      Moderately Severe Depression       Assessment    1  Major depressive disorder, single episode (296 20) (F32 9)   2   Generalized anxiety disorder (300 02) (F41 1)    Signatures   Electronically signed by : Eduardo Erwin LCSW; Mar 15 2016 10:54AM EST                       (Author)    Electronically signed by : Eduardo Erwin LCSW; Mar 16 2016 10:08AM EST                       (Author)

## 2018-01-15 NOTE — PSYCH
Risk Assessment    The following ratings are based on my observation of this patient over the last day and interview(s) with parents  Risk of Harm to Self:   Demographic risk factors include , alaskan, or native [place] and age: young adult (15-24)  Recent Specific Risk Factors include: experienced fleeting ideation, sense of hopelessness/helplessness, unable to visualize a realistic positive future, feelings of guilt or self blame and diagnosis of depression  These risk factors presented within the last month  Additional Factors for a Child or Adolescent: gender: female (more likely to attempt), age over 13, outsider among peers/being bullied and sexuality concern  Risk of Harm to Others:   Recent Specific Risk Factors include: multiple stressors  Based on the above information, the client presents the following risk of harm to self or others: low  The following interventions are recommended: referral to pcp for change in dosage of antidepressant  Notes regarding this Risk Assessment: pt denied intent, plan  Active Problems    1  Abdominal pain, acute, right upper quadrant (789 01,338 19) (R10 11)   2  Acute abdominal pain in left upper quadrant (789 02,338 19) (R10 12)   3  Anxiety and depression (300 4) (F41 8)   4  Coccydynia (724 79) (M53 3)   5  Generalized anxiety disorder (300 02) (F41 1)   6  Insomnia (780 52) (G47 00)   7  Moderate mood disorder (296 90) (F39)   8  Overweight (278 02) (E66 3)   9  Panic attack (300 01) (F41 0)   10  Primary insomnia (307 42) (F51 01)    Past Medical History    1  History of A Fall (E888 9)   2  Discussion of Acute upper respiratory infection (465 9) (J06 9)   3  History of dehydration (V12 29) (Z86 39)   4  History of low back pain (V13 59) (Z87 39)   5  History of nausea and vomiting (V12 79) (Z87 898)   6  History of Need for Menactra vaccination (V03 89) (Z23)   7  History of Need for vaccination for DTP (V06 1) (Z23)   8   History of Sprained Ribs (848 3)   9   History of Vesicoureteral reflux (593 70) (N13 70)    Future Appointments    Date/Time Provider Specialty Site   [facility]   05/12/2016 03:00 PM Bela Pena [place]   05/19/2016 02:00 [facility]   06/06/2016 08:00 AM Bela Pena [facility] [address] [facility]   06/13/2016 08:00 AM Bela Pena [address] [facility]   06/20/2016 08:00 AM Bela Villar [facility]     Signatures   Electronically signed by : Mita Rodgers LCSW; Apr 28 2016 10:17AM EST                       (Author)

## 2018-01-16 NOTE — PSYCH
Progress Note  Psychotherapy Provided [facility]: Individual Psychotherapy 50 minutes provided today  Goals addressed in session:   1-3D: P: Pt was seen for Individual Therapy session  Td Farrar returned for an impromptu therapy session today to discuss her extremely positive adjustment to college and to review the completion of her previously assigned homework  A: Td Farrar has re-developed a closer relationship with her family, her tyrese, and has built new friendships since starting college  She has been taking her medication as prescribed and is engaging in yoga, daily exercise  She continues to experience mood swings, but denies any suicidality or substance use  P: Additional sessions will be scheduled during semester breaks  Pain Scale and Suicide Risk [facility]: Current Pain Assessment: no pain   Behavioral Health Treatment Plan [facility]: Diagnosis and Treatment Plan explained to patient, patient relates understanding diagnosis and is agreeable to Treatment Plan  Assessment    1  Moderate mood disorder (296 90) (F39)   2   Panic disorder without agoraphobia with moderate panic attacks (300 01) (F41 0)    Signatures   Electronically signed by : Charo Bueno LCSW; Sep 16 2016 10:00AM EST                       (Author)

## 2018-01-16 NOTE — PSYCH
Progress Note  Psychotherapy Provided [facility]: Individual Psychotherapy 50 minutes provided today  Goals addressed in session:   1-3D: P: Pt was seen for Individual Therapy session  Praveen Benítez spoke about her feelings re: her paramour's decision to terminate their relationship and the impact this is having on her ability to function  Praveen Benítez reported that she quit her job and is having difficulty getting out of bed  A: Praveen Benítez acknowledges passive death wishes but no intent or plan  She feels considerable pressure (witnessed by this worker) to "get over it" and return to normal functioning (which she has not had in two years)  P: Her mom was contacted and a referral to [facility] will be initiated  Pain Scale and Suicide Risk [facility]: Current Pain Assessment: no pain   Current suicide risk is low   Behavioral Health Treatment Plan [facility]: Diagnosis and Treatment Plan explained to patient, patient relates understanding diagnosis and is agreeable to Treatment Plan  Assessment    1  PTSD (post-traumatic stress disorder) (309 81) (F43 10)   2   Moderate mood disorder (296 90) (F39)    Signatures   Electronically signed by : Paco Funk LCSW; Apr 28 2017 12:46PM EST                       (Author)

## 2018-01-16 NOTE — PSYCH
Progress Note  Psychotherapy Provided [facility]: Individual Psychotherapy 50 minutes provided today  Goals addressed in session:   1-3D: P: Pt was seen for Individual Therapy session  Pt spoke today re: her feelings about her trauma history  She shared that she struggles with "believing that it really happened  Instances of trauma in her past were reviewed  Her Treatment Plan was also updated  A: Praveen Benítez continues to minimize her trauma as a defense mechanism  However, she is more stable at this point emotionally  P: Additional sessions were scheduled during which the above will continue to be addressed  Pain Scale and Suicide Risk [facility]: Current Pain Assessment: no pain   Current suicide risk is low   Behavioral Health Treatment Plan [facility]: Diagnosis and Treatment Plan explained to patient, patient relates understanding diagnosis and is agreeable to Treatment Plan  Assessment    1  Generalized anxiety disorder (300 02) (F41 1)   2  Moderate mood disorder (296 90) (F39)   3   Panic disorder without agoraphobia with moderate panic attacks (300 01) (F41 0)    Signatures   Electronically signed by : Paco Funk LCSW; Justen 15 2016  9:28AM EST                       (Author)

## 2018-01-16 NOTE — PSYCH
Progress Note  Psychotherapy Provided [facility]: Individual Psychotherapy 50 minutes provided today  Goals addressed in session:   1-3D: P: Pt was seen for Individual Therapy session  Pt spoke about her feelings re: the loss of support from her two best friends over the past several months  Her negative thinking and dependency was reviewed  A: Pt was very open as the session progressed  P: Additional sessions were scheduled during which the goals will begin to be addressed  Assessment    1  Clinical depression (311) (F32 9)   2   Generalized anxiety disorder (300 02) (F41 1)    Signatures   Electronically signed by : Monica Talbert LCSW; Feb 12 2016 10:34AM EST                       (Author)

## 2018-01-16 NOTE — PSYCH
Progress Note  Psychotherapy Provided [facility]: Individual Psychotherapy 50 minutes provided today  Goals addressed in session:   1-3D: P: Pt was seen for Individual Therapy session  Pt spoke today re: her feelings re: substance use over past several weeks  She admitted that she has not been taking Abilify as she believes it is "all in her head "   A: Lona Mosquera continues to minimize / deny her trauma history and to sabatoge relationships with family and friends  While her mother indicates that they "will not" send her away to college in her current state, Lona Mosquera believes that they will send her and that she "will be fine" She denied any suicidality or self harm  P: Additional sessions were scheduled during which the above will continue to be addressed  Pain Scale and Suicide Risk [facility]: Current Pain Assessment: no pain   Current suicide risk is low   Behavioral Health Treatment Plan [facility] Rosenberg: Diagnosis and Treatment Plan explained to patient, patient relates understanding diagnosis and is agreeable to Treatment Plan  Assessment    1  Moderate mood disorder (296 90) (F39)   2   Panic disorder without agoraphobia with moderate panic attacks (300 01) (F41 0)    Signatures   Electronically signed by : Renato Johnson LCSW; Jul 13 2016  9:56AM EST                       (Author)

## 2018-01-17 NOTE — PSYCH
Message  Message Free Text Note Form: lm re: ns for today's appt      Active Problems    1  Abdominal pain, acute, right upper quadrant (789 01,338 19) (R10 11)   2  Acute abdominal pain in left upper quadrant (789 02,338 19) (R10 12)   3  Anxiety and depression (300 4) (F41 8)   4  Clinical depression (311) (F32 9)   5  Coccydynia (724 79) (M53 3)   6  Generalized anxiety disorder (300 02) (F41 1)   7  Insomnia (780 52) (G47 00)   8  Overweight (278 02) (E66 3)   9  Panic attack (300 01) (F41 0)   10  Primary insomnia (307 42) (F51 01)    Current Meds   1  BusPIRone HCl - 5 MG Oral Tablet; TAKE 1 TABLET am, two at 1:00pm and two at   8:00pm;   Therapy: 20XCU9825 to (Evaluate:22Aed9995)  Requested for: 68YWL3003; Last   Rx:04Jun2015 Ordered   2  TraZODone HCl - 50 MG Oral Tablet; TAKE 1 TABLET AT BEDTIME; Therapy: 47WSM1093 to (Evaluate:81Aho3345)  Requested for: 72KPJ2468; Last   Rx:04Jan2016 Ordered   3  Venlafaxine HCl ER 37 5 MG Oral Capsule Extended Release 24 Hour; one P O  first   week, two P O  second week, three  P O  thereafter; Therapy: 75SYS2926 to (Last MW:54OIJ6849)  Requested for: 21XAI6969 Ordered    Allergies    1   No Known Drug Allergies    Signatures   Electronically signed by : Nitza Hines LCSW; Mar  4 2016  9:13AM EST                       (Author)

## 2018-01-17 NOTE — PSYCH
Progress Note  Psychotherapy Provided [facility]: Individual Psychotherapy 50 minutes provided today  Goals addressed in session:   1-3D: P: Pt was seen for Individual Therapy session  Pt spoke today re: her feelings re: the contact she has begun to have with the person who will be her roommate at college  She also requested homework for the time she will be on vacation next week  A: Praveen Benítez continues to benefit from her relationship with this worker and is not looking forward to leaving for school  She will be seen during breaks and will be encouraged to consider pursuing a field of interest that she finds passion in    P: Additional sessions were scheduled during which the above will continue to be addressed  Pain Scale and Suicide Risk [facility]: Current Pain Assessment: no pain   Behavioral Health Treatment Plan Amalia Cantu: Diagnosis and Treatment Plan explained to patient, patient relates understanding diagnosis and is agreeable to Treatment Plan  Assessment    1  Moderate mood disorder (296 90) (F39)   2   Panic disorder without agoraphobia with moderate panic attacks (300 01) (F41 0)    Signatures   Electronically signed by : Paco Funk LCSW; Aug  2 2016  4:03PM EST                       (Author)

## 2018-01-17 NOTE — PSYCH
Message  Message Free Text Note Form: Mom called to report that after spending 48 hours on ER, pt was admitted to [address] [place] [address] Inpt Unit  and will therefore not be attending Tues session with this worker  Active Problems    1  Abdominal pain, acute, right upper quadrant (789 01,338 19) (R10 11)   2  Acute abdominal pain in left upper quadrant (789 02,338 19) (R10 12)   3  Anxiety and depression (300 4) (F41 8)   4  Coccydynia (724 79) (M53 3)   5  Generalized anxiety disorder (300 02) (F41 1)   6  Insomnia (780 52) (G47 00)   7  Major depressive disorder, single episode (296 20) (F32 9)   8  Overweight (278 02) (E66 3)   9  Panic attack (300 01) (F41 0)   10  Primary insomnia (307 42) (F51 01)    Current Meds   1  BusPIRone HCl - 5 MG Oral Tablet; TAKE 1 TABLET am, two at 1:00pm and two at   8:00pm;   Therapy: 44HKE1599 to (Evaluate:13Rsu5143)  Requested for: 65SVH5112; Last   Rx:11Gum2288 Ordered   2  TraZODone HCl - 50 MG Oral Tablet; TAKE 1 TABLET AT BEDTIME; Therapy: 85BQT2301 to (Evaluate:17Mmq3277)  Requested for: 13CCD0008; Last   Rx:85Iwi4216 Ordered   3  Venlafaxine HCl ER 37 5 MG Oral Capsule Extended Release 24 Hour; one P O  first   week, two P O  second week, three  P O  thereafter; Therapy: 67FCH6238 to (Last ZP:93XJD6779)  Requested for: 46GHS4008 Ordered    Allergies    1   No Known Drug Allergies    Signatures   Electronically signed by : Lindon Caller, LCSW; Mar 21 2016  1:50PM EST                       (Author)

## 2018-01-17 NOTE — PSYCH
Progress Note  Psychotherapy Provided [facility]: Family Therapy provided today  Goals addressed in session:   1-3D: P: Pt and mom were seen for a Family Therapy session  Pt completed PHQ-9 but refused to speak about her feelings of depression and suicidality today despite repeated prompts and attempts by this worker and her mother  A: Pt appears to believe that her mother would perceive her as being overreactive if she were to verbalize feelings of suicidality  A Risk Assessment was completed  P: As a result of the above, the family was referred directly to the ER for further evaluation and assessment for a higher level of care  Pain Scale and Suicide Risk [facility]: Current Pain Assessment: no pain   Current suicide risk is moderate (see risk assessment checklist)   Behavioral Health Treatment Plan [facility]: Diagnosis and Treatment Plan explained to patient, patient relates understanding diagnosis and is agreeable to Treatment Plan  Assessment    1  Major depressive disorder, single episode (296 20) (F32 9)   2   Generalized anxiety disorder (300 02) (F41 1)    Signatures   Electronically signed by : Elena Lord LCSW; Mar 17 2016  2:04PM EST                       (Author)

## 2018-01-17 NOTE — PSYCH
Treatment Plan Tracking    #1 Treatment Plan not completed within required time limits due to: Client presented with emotional/behavioral issues that required clinical intervention            Signatures   Electronically signed by : Carmelo Duckworth LCSW; Feb 13 2017 12:58PM EST                       (Author)

## 2018-01-18 NOTE — PSYCH
Progress Note  Psychotherapy Provided [facility]: Individual Psychotherapy 50 minutes provided today  Goals addressed in session:   1-3D: P: Pt was seen for Individual Therapy session  Pt spoke about her feelings since being discharged from INpatient at [address] [place] [address] [address] and returning to school  Her new medication, diagnosis were discussed  A: Pt reported that her mother has been more supportive of her since her hospitalization  P: Additional sessions were scheduled during which the above will continue to be addressed  Pain Scale and Suicide Risk [facility]: Current Pain Assessment: no pain   Current suicide risk is low   Behavioral Health Treatment Plan [facility]: Diagnosis and Treatment Plan explained to patient, patient relates understanding diagnosis and is agreeable to Treatment Plan  Results/Data  Encounter Results   PHQ-9 Adolescent Depression Screening 04Apr2016 09:56AM [address]     Test Name Result Flag Reference   PHQ-9 Adolescent Depression Score 8     Q1: 1, Q2: 2, Q3: 2, Q4: 1, Q5: 0, Q6: 1, Q7: 0, Q8: 1, Q9: 0   PHQ-9 Adolescent Depression Screening Negative     PHQ-9 Difficulty Level Somewhat difficult     PHQ-9 Severity Mild Depression         Assessment    1  Moderate mood disorder (296 90) (F39)   2   Generalized anxiety disorder (300 02) (F41 1)    Signatures   Electronically signed by : Kori Woody LCSW; Apr 6 2016  7:54AM EST                       (Author)    Electronically signed by : Kori Woody LCSW; Apr 6 2016  4:07PM EST                       (Author)

## 2018-01-18 NOTE — PSYCH
Date of Initial Treatment Plan: 2/5/2016  Date of Current Treatment Plan: 2/5/2016  Treatment Plan 1  Strengths/Personal Resources for Self Care: I am productive, an achiever, knowledgeable, a leader, a , forgiving, understanding, compassionate, accomplished, analytical, tolerant, unselfish, naive, loyal, funny, intense, changeable, intelligent, a facilitator, open-minded, sensitive, sincere, self-reliant, a good listener, and perceptive  Diagnosis:   Axis I: MDD     Current Challenges/Problems/Needs: I over think everything  I dwell on things- I struggle with committing to things- it scares me  I am my toughest critic  Nothing is ever good enough  I am a big time perfectionist  I don't / can't trust be  Long Term Goals:   I want to be more trusting  Target Date: 6/5/2016      I want to be accept as myself so I can be accepted by other people  Target Date: 6/5/2016      I want my happiness to be dependent on me   Target Date: 6/5/2016      Short Term Objectives:   Goal 1:   I will take people's word --initially at face value instead of overthinking their intentions  I will talk in therapy about my current struggles to trust    Target Date: 5/5/2016      Goal 2:   I will practice expressing myself to people more openly  Target Date: 5/5/2016      Goal 3:   I will do thought stopping   I will do yoga  I will set and follow through with daily goals  I will learn to challenge my thinking  Target Date: 5/5/2016      GOAL 1: Modality: Individual 2 x per month Target Date: 6/5/2016         GOAL 2: Modality: Individual 2 x per month Target Date: 6/5/2016         GOAL 3: Modality: Individual 2 x per month Target Date: 6/5/2016         The first scheduled review date is 6/5/2016  The expected length of service is 6 mos                      CLIENT COMMENTS / Please share your thoughts, feelings, need and/or experiences regarding your treatment plan: _____________________________________________________________________________________________________________________________________________________________________________________________________________________________________________________________________________________________________________________ Date/Time: ______________     Patient Signature: _________________________________ Date/Time: ______________        1  Abdominal pain, acute, right upper quadrant (789 01,338 19) (R10 11)   2  Acute abdominal pain in left upper quadrant (789 02,338 19) (R10 12)   3  Anxiety and depression (300 4) (F41 8)   4  Clinical depression (311) (F32 9)   5  Coccydynia (724 79) (M53 3)   6  Generalized anxiety disorder (300 02) (F41 1)   7  Insomnia (780 52) (G47 00)   8  Overweight (278 02) (E66 3)   9  Panic attack (300 01) (F41 0)   10   Primary insomnia (307 42) (F51 01)     Electronically signed by : Santi Leo LCSW; Feb 5 2016  9:48AM EST                       (Author)

## 2018-01-18 NOTE — PSYCH
Progress Note  Psychotherapy Provided [facility]: Family Therapy provided today  Goals addressed in session:   1-3D: Pt was seen with her parents for Family Therapy session  Pt spoke about her feelings of sadness re: further alienation from peers as they once again went to [employer] about their concerns for her safety  PHQ-9 and Risk Assessment were completed  Guidance Counselor was contacted and parents were brought in to discuss concerns  A: Michael Brady appears to have stopped taking her medication which has led to a regression in her functioning  She denied suicidal intent, plan  She has come out to her parents who are accepting of her but she is not yet accepting of herself  Her pcp will be contacted about increasing her antidepressant dosage  P: Additional sessions were scheduled during which the above will continue to be addressed  Pain Scale and Suicide Risk [facility]: Current Pain Assessment: no pain   Current suicide risk is low   Behavioral Health Treatment Plan [facility]: Diagnosis and Treatment Plan explained to patient, patient relates understanding diagnosis and is agreeable to Treatment Plan            Signatures   Electronically signed by : Santi Leo LCSW; Apr 28 2016 10:13AM EST                       (Author)

## 2018-03-07 NOTE — PROGRESS NOTES
[facility]  Guidance Office      To Who It May Concern:        As per both our previous conversation as well as your with the above-named student's  parent, I am writing today in order to recommend that she be allowed to attend school for partial days for the remainder of the school year        Respectfully Submitted,         Monica Talbert, [address] [place] [address]      Electronically signed by:Bisi Pena LCSW  May  2 2016  1:04PM EST

## 2018-03-07 NOTE — PSYCH
History of Present Illness    Discharge Summary: Admission Date: 1/29/16 Discharge Date: 1/5/18  Blanquita Aleman Discharge Diagnosis:    Axis I: Moderate Mood D/O  Treatment Complications: lack of parental support   Prognosis at time of discharge is poor  Presenting Problem/Pertinent findings: Dieter Coe reported at Intake that she "is not 'keen' on the whole therapy process," and that her mom does not know / believe that anything is wrong with her  She reported that she has "3 different moods "  Course of treatment includes  individual counseling and family counseling  Treatment Progress: Dean Mae attended therapy sessions on a sporadic basis with this worker  She was referred to a higher level of care on multiple occasions  She was accepted to college out of the area and moved away for one semester, but returned for therapy after dropping out of all of her classes near the end of the semester  Therapy was discontinued at the patient's request when she was unwilling to continue scheduling  The consumer achieved none of their goals  Criteria for Discharge: The patient has  demonstrated failure to uphold their treatment plan/contract   no further sessions were scheduled  Active Problems    1  Alcohol use disorder (305 00) (F10 99)   2  Chronic fatigue (780 79) (R53 82)   3  Dyshidrotic eczema (705 81) (L30 1)   4  Medication overdose, accidental or unintentional, initial encounter (977 9,E858 9)   (T50 901A)   5  Moderate mood disorder (296 90) (F39)   6  PPD screening test (V74 1) (Z11 1)   7  Primary insomnia (307 42) (F51 01)   8  PTSD (post-traumatic stress disorder) (309 81) (F43 10)   9  Screening for [place] measles (V73 3) (Z11 59)   10  Screening for rubella (V73 3) (Z11 59)    Past Medical History    1  History of A Fall (E888 9)   2  History of Acute abdominal pain in left upper quadrant (789 02,338 19) (R10 12)   3  History of Acute abdominal pain in left upper quadrant (789 02,338 19) (R10 12)   4  Discussion of Acute upper respiratory infection (465 9) (J06 9)   5  History of dehydration (V12 29) (Z86 39)   6  History of low back pain (V13 59) (Z87 39)   7  History of nausea and vomiting (V12 79) (Z87 898)   8  History of Need for Menactra vaccination (V03 89) (Z23)   9  History of Need for vaccination for DTP (V06 1) (Z23)   10  History of Sprained Ribs (848 3)   11  History of Vesicoureteral reflux (593 70) (N13 70)    Social History    · Never A Smoker   · Never Drank Alcohol   · Other parent-child estrangement (V61 29) (J93 913)    Allergies    1   No Known Drug Allergies    Signatures   Electronically signed by : Yoni Burnett LCSW; Jan 5 2018  3:45PM EST                       (Author)    Electronically signed by : KESHAV Morris ; Jan 5 2018  5:03PM EST                       (Author)

## 2018-12-11 ENCOUNTER — OFFICE VISIT (OUTPATIENT)
Dept: FAMILY MEDICINE CLINIC | Facility: CLINIC | Age: 18
End: 2018-12-11
Payer: COMMERCIAL

## 2018-12-11 VITALS
DIASTOLIC BLOOD PRESSURE: 72 MMHG | WEIGHT: 215.4 LBS | BODY MASS INDEX: 32.64 KG/M2 | SYSTOLIC BLOOD PRESSURE: 116 MMHG | HEIGHT: 68 IN

## 2018-12-11 DIAGNOSIS — Z00.00 HEALTH MAINTENANCE EXAMINATION: Primary | ICD-10-CM

## 2018-12-11 DIAGNOSIS — Z11.1 TUBERCULOSIS SCREENING: ICD-10-CM

## 2018-12-11 PROBLEM — IMO0002 ALCOHOL USE DISORDER: Status: RESOLVED | Noted: 2017-06-20 | Resolved: 2018-12-11

## 2018-12-11 PROBLEM — L30.1 DYSHIDROTIC ECZEMA: Status: ACTIVE | Noted: 2017-04-18

## 2018-12-11 PROBLEM — F43.10 PTSD (POST-TRAUMATIC STRESS DISORDER): Status: ACTIVE | Noted: 2017-04-18

## 2018-12-11 PROBLEM — F10.90 ALCOHOL USE DISORDER: Status: ACTIVE | Noted: 2017-06-20

## 2018-12-11 PROBLEM — IMO0002 ALCOHOL USE DISORDER: Status: ACTIVE | Noted: 2017-06-20

## 2018-12-11 PROBLEM — F10.90 ALCOHOL USE DISORDER: Status: RESOLVED | Noted: 2017-06-20 | Resolved: 2018-12-11

## 2018-12-11 PROBLEM — T50.901A MEDICATION OVERDOSE: Status: RESOLVED | Noted: 2017-06-20 | Resolved: 2018-12-11

## 2018-12-11 PROBLEM — T50.901A MEDICATION OVERDOSE: Status: ACTIVE | Noted: 2017-06-20

## 2018-12-11 PROCEDURE — 99395 PREV VISIT EST AGE 18-39: CPT | Performed by: NURSE PRACTITIONER

## 2018-12-11 RX ORDER — ARIPIPRAZOLE 5 MG/1
5 TABLET ORAL
COMMUNITY
Start: 2016-05-10 | End: 2018-12-11 | Stop reason: ALTCHOICE

## 2018-12-11 NOTE — PATIENT INSTRUCTIONS
Complete blood work and we will complete blood work  Wellness Visit for Adults   AMBULATORY CARE:   A wellness visit  is when you see your healthcare provider to get screened for health problems  You can also get advice on how to stay healthy  Write down your questions so you remember to ask them  Ask your healthcare provider how often you should have a wellness visit  What happens at a wellness visit:  Your healthcare provider will ask about your health, and your family history of health problems  This includes high blood pressure, heart disease, and cancer  He or she will ask if you have symptoms that concern you, if you smoke, and about your mood  You may also be asked about your intake of medicines, supplements, food, and alcohol  Any of the following may be done:  · Your weight  will be checked  Your height may also be checked so your body mass index (BMI) can be calculated  Your BMI shows if you are at a healthy weight  · Your blood pressure  and heart rate will be checked  Your temperature may also be checked  · Blood and urine tests  may be done  Blood tests may be done to check your cholesterol levels  Abnormal cholesterol levels increase your risk for heart disease and stroke  You may also need a blood or urine test to check for diabetes if you are at increased risk  Urine tests may be done to look for signs of an infection or kidney disease  · A physical exam  includes checking your heartbeat and lungs with a stethoscope  Your healthcare provider may also check your skin to look for sun damage  · Screening tests  may be recommended  A screening test is done to check for diseases that may not cause symptoms  The screening tests you may need depend on your age, gender, family history, and lifestyle habits  For example, colorectal screening may be recommended if you are 48years old or older    Screening tests you need if you are a woman:   · A Pap smear  is used to screen for cervical cancer  Pap smears are usually done every 3 to 5 years depending on your age  You may need them more often if you have had abnormal Pap smear test results in the past  Ask your healthcare provider how often you should have a Pap smear  · A mammogram  is an x-ray of your breasts to screen for breast cancer  Experts recommend mammograms every 2 years starting at age 48 years  You may need a mammogram at age 52 years or younger if you have an increased risk for breast cancer  Talk to your healthcare provider about when you should start having mammograms and how often you need them  Vaccines you may need:   · Get an influenza vaccine  every year  The influenza vaccine protects you from the flu  Several types of viruses cause the flu  The viruses change over time, so new vaccines are made each year  · Get a tetanus-diphtheria (Td) booster vaccine  every 10 years  This vaccine protects you against tetanus and diphtheria  Tetanus is a severe infection that may cause painful muscle spasms and lockjaw  Diphtheria is a severe bacterial infection that causes a thick covering in the back of your mouth and throat  · Get a human papillomavirus (HPV) vaccine  if you are female and aged 23 to 32 or male 23 to 24 and never received it  This vaccine protects you from HPV infection  HPV is the most common infection spread by sexual contact  HPV may also cause vaginal, penile, and anal cancers  · Get a pneumococcal vaccine  if you are aged 72 years or older  The pneumococcal vaccine is an injection given to protect you from pneumococcal disease  Pneumococcal disease is an infection caused by pneumococcal bacteria  The infection may cause pneumonia, meningitis, or an ear infection  · Get a shingles vaccine  if you are aged 61 or older, even if you have had shingles before  The shingles vaccine is an injection to protect you from the varicella-zoster virus  This is the same virus that causes chickenpox  Shingles is a painful rash that develops in people who had chickenpox or have been exposed to the virus  How to eat healthy:  My Plate is a model for planning healthy meals  It shows the types and amounts of foods that should go on your plate  Fruits and vegetables make up about half of your plate, and grains and protein make up the other half  A serving of dairy is included on the side of your plate  The amount of calories and serving sizes you need depends on your age, gender, weight, and height  Examples of healthy foods are listed below:  · Eat a variety of vegetables  such as dark green, red, and orange vegetables  You can also include canned vegetables low in sodium (salt) and frozen vegetables without added butter or sauces  · Eat a variety of fresh fruits , canned fruit in 100% juice, frozen fruit, and dried fruit  · Include whole grains  At least half of the grains you eat should be whole grains  Examples include whole-wheat bread, wheat pasta, brown rice, and whole-grain cereals such as oatmeal     · Eat a variety of protein foods such as seafood (fish and shellfish), lean meat, and poultry without skin (turkey and chicken)  Examples of lean meats include pork leg, shoulder, or tenderloin, and beef round, sirloin, tenderloin, and extra lean ground beef  Other protein foods include eggs and egg substitutes, beans, peas, soy products, nuts, and seeds  · Choose low-fat dairy products such as skim or 1% milk or low-fat yogurt, cheese, and cottage cheese  · Limit unhealthy fats  such as butter, hard margarine, and shortening  Exercise:  Exercise at least 30 minutes per day on most days of the week  Some examples of exercise include walking, biking, dancing, and swimming  You can also fit in more physical activity by taking the stairs instead of the elevator or parking farther away from stores  Include muscle strengthening activities 2 days each week   Regular exercise provides many health benefits  It helps you manage your weight, and decreases your risk for type 2 diabetes, heart disease, stroke, and high blood pressure  Exercise can also help improve your mood  Ask your healthcare provider about the best exercise plan for you  General health and safety guidelines:   · Do not smoke  Nicotine and other chemicals in cigarettes and cigars can cause lung damage  Ask your healthcare provider for information if you currently smoke and need help to quit  E-cigarettes or smokeless tobacco still contain nicotine  Talk to your healthcare provider before you use these products  · Limit alcohol  A drink of alcohol is 12 ounces of beer, 5 ounces of wine, or 1½ ounces of liquor  · Lose weight, if needed  Being overweight increases your risk of certain health conditions  These include heart disease, high blood pressure, type 2 diabetes, and certain types of cancer  · Protect your skin  Do not sunbathe or use tanning beds  Use sunscreen with a SPF 15 or higher  Apply sunscreen at least 15 minutes before you go outside  Reapply sunscreen every 2 hours  Wear protective clothing, hats, and sunglasses when you are outside  · Drive safely  Always wear your seatbelt  Make sure everyone in your car wears a seatbelt  A seatbelt can save your life if you are in an accident  Do not use your cell phone when you are driving  This could distract you and cause an accident  Pull over if you need to make a call or send a text message  · Practice safe sex  Use latex condoms if are sexually active and have more than one partner  Your healthcare provider may recommend screening tests for sexually transmitted infections (STIs)  · Wear helmets, lifejackets, and protective gear  Always wear a helmet when you ride a bike or motorcycle, go skiing, or play sports that could cause a head injury  Wear protective equipment when you play sports  Wear a lifejacket when you are on a boat or doing water sports    © 2017 [facility] Information is for End User's use only and may not be sold, redistributed or otherwise used for commercial purposes  All illustrations and images included in CareNotes® are the copyrighted property of A D A M [employer]  or [employer]  The above information is an  only  It is not intended as medical advice for individual conditions or treatments  Talk to your doctor, nurse or pharmacist before following any medical regimen to see if it is safe and effective for you

## 2018-12-11 NOTE — PROGRESS NOTES
Assessment/Plan:    Health Maintenance Examination   Overall doing very well  Has no complaints  Vitals stable  Up to date with immunizations  Will be do for pap at age 22  Overall mood has been very good  Will get TB screening as required, other paperwork completed  Follow up yearly or PRN  Handout provided  Call us if you experience any worsening symptoms or no improvement  Diagnoses and all orders for this visit:    Tuberculosis screening  -     Quantiferon TB Gold Plus; Future    Other orders  -     Discontinue: ARIPiprazole (ABILIFY) 5 mg tablet; Take 5 mg by mouth      Patient verbalizes understand and agrees with treatment plan  Subjective:        Patient ID: Aurelio Ayala is a 19 y o  female  Chief Complaint   Patient presents with    Physical Exam     for paramedic school through [employer]  Patient presents to office today for physical and 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  Patient is currently up-to-date with eye doctor and she wears contacts  Patient has no current complaints or concerns  Patient states she is overall doing very well  Patient was on Abilify about 2 years ago but stopped because she did not like the way it made her feel  Patient states that she was just in a bad place a few years ago but states she does not feel like she has a bipolar diagnosis that she has been fine off medication for 2 years now  Patient denies any thoughts of suicidal ideation or homicidal ideation  Patient does have a therapist that she sees every now and then when needed  Patient denies any mood swings or irritability  Patient denies any recreational drug use  She does use alcohol about 1-2 times per month            The following portions of the patient's history were reviewed and updated as appropriate: allergies, current medications, past family history, past social history and problem list     Review of Systems   Constitutional: Negative for chills and fever  HENT: Negative for congestion  Eyes: Negative for pain and visual disturbance  Respiratory: Negative for cough and shortness of breath  Cardiovascular: Negative for chest pain, palpitations and leg swelling  Gastrointestinal: Negative for abdominal pain, diarrhea, nausea and vomiting  Genitourinary: Negative for difficulty urinating and dysuria  Musculoskeletal: Negative for arthralgias and myalgias  Skin: Negative for color change and rash  Neurological: Negative for dizziness, syncope, numbness and headaches  Hematological: Negative for adenopathy  Psychiatric/Behavioral: Negative for agitation and behavioral problems  The patient is not nervous/anxious  Objective:  /72 (BP Location: Left arm, Patient Position: Sitting, Cuff Size: Large)   Ht 5' 7 5" (1 715 m)   Wt 97 7 kg (215 lb 6 4 oz)   BMI 33 24 kg/m²      Physical Exam   Constitutional: She is oriented to person, place, and time  She appears well-developed  No distress  obese   HENT:   Head: Normocephalic and atraumatic  Right Ear: Hearing, tympanic membrane, external ear and ear canal normal    Left Ear: Hearing, tympanic membrane, external ear and ear canal normal    Nose: Nose normal    Mouth/Throat: Oropharynx is clear and moist  No oropharyngeal exudate  Eyes: Pupils are equal, round, and reactive to light  Conjunctivae, EOM and lids are normal  Right eye exhibits no discharge  Left eye exhibits no discharge  No scleral icterus  Neck: Normal range of motion  Neck supple  No tracheal deviation present  Cardiovascular: Normal rate and regular rhythm  No murmur heard  Pulmonary/Chest: Effort normal and breath sounds normal  No respiratory distress  She has no wheezes  Abdominal: Soft  Bowel sounds are normal  She exhibits no distension  There is no tenderness  There is no guarding  Musculoskeletal: Normal range of motion  She exhibits no edema, tenderness or deformity  Lymphadenopathy:     She has no cervical adenopathy  Neurological: She is alert and oriented to person, place, and time  No cranial nerve deficit  Coordination normal    Skin: Skin is warm and dry  No rash noted  She is not diaphoretic  No erythema  Psychiatric: She has a normal mood and affect  Her speech is normal and behavior is normal  Judgment and thought content normal  Cognition and memory are normal    Nursing note and vitals reviewed

## 2018-12-12 ENCOUNTER — TELEPHONE (OUTPATIENT)
Dept: FAMILY MEDICINE CLINIC | Facility: CLINIC | Age: 18
End: 2018-12-12

## 2018-12-12 NOTE — TELEPHONE ENCOUNTER
----- Message from [address] sent at 12/11/2018 11:35 AM EST -----  Form on your desk  Please print out immunization record for it  Waiting on blood work from her to be completed  Please hang on to       Thanks

## 2018-12-24 ENCOUNTER — TELEPHONE (OUTPATIENT)
Dept: FAMILY MEDICINE CLINIC | Facility: CLINIC | Age: 18
End: 2018-12-24

## 2018-12-24 NOTE — TELEPHONE ENCOUNTER
Sergio Duran from [facility] called about the Quantaferin  TB Plus: It was unable to be run due to not having the tube filled all the way

## 2019-09-09 ENCOUNTER — TELEPHONE (OUTPATIENT)
Dept: FAMILY MEDICINE CLINIC | Facility: CLINIC | Age: 19
End: 2019-09-09

## 2019-09-19 ENCOUNTER — APPOINTMENT (OUTPATIENT)
Dept: RADIOLOGY | Facility: MEDICAL CENTER | Age: 19
End: 2019-09-19
Payer: OTHER MISCELLANEOUS

## 2019-09-19 ENCOUNTER — OCCMED (OUTPATIENT)
Dept: URGENT CARE | Facility: MEDICAL CENTER | Age: 19
End: 2019-09-19
Payer: OTHER MISCELLANEOUS

## 2019-09-19 DIAGNOSIS — S69.91XA INJURY OF RIGHT HAND, INITIAL ENCOUNTER: ICD-10-CM

## 2019-09-19 DIAGNOSIS — S69.91XA INJURY OF RIGHT HAND, INITIAL ENCOUNTER: Primary | ICD-10-CM

## 2019-09-19 PROCEDURE — 99284 EMERGENCY DEPT VISIT MOD MDM: CPT | Performed by: FAMILY MEDICINE

## 2019-09-19 PROCEDURE — 73130 X-RAY EXAM OF HAND: CPT

## 2019-09-19 PROCEDURE — G0383 LEV 4 HOSP TYPE B ED VISIT: HCPCS | Performed by: FAMILY MEDICINE

## 2019-09-20 ENCOUNTER — HOSPITAL ENCOUNTER (EMERGENCY)
Facility: HOSPITAL | Age: 19
Discharge: HOME | End: 2019-09-20
Attending: EMERGENCY MEDICINE | Admitting: EMERGENCY MEDICINE
Payer: OTHER MISCELLANEOUS

## 2019-09-20 VITALS
HEART RATE: 92 BPM | BODY MASS INDEX: 33 KG/M2 | TEMPERATURE: 98 F | SYSTOLIC BLOOD PRESSURE: 148 MMHG | RESPIRATION RATE: 16 BRPM | DIASTOLIC BLOOD PRESSURE: 93 MMHG | WEIGHT: 213.85 LBS | OXYGEN SATURATION: 98 %

## 2019-09-20 DIAGNOSIS — S60.10XA SUBUNGUAL HEMATOMA OF FINGER OF RIGHT HAND, INITIAL ENCOUNTER: Primary | ICD-10-CM

## 2019-09-20 PROCEDURE — 99282 EMERGENCY DEPT VISIT SF MDM: CPT

## 2019-09-20 PROCEDURE — 99283 EMERGENCY DEPT VISIT LOW MDM: CPT | Performed by: EMERGENCY MEDICINE

## 2019-09-21 NOTE — ED NOTES
Seen assessed and discharged by [facility] independent of nursing staff        Ruthie Cruz RN  09/20/19 1798

## 2019-09-21 NOTE — ED PROVIDER NOTES
History  Chief Complaint   Patient presents with    Subungual Hematoma     Patient c/o increased pain to R nail beds of 3rd and 4th digits  Reports closing hand in a door yesterday, had an x-ray which was negative  Pt is a 22year old female presenting with increased pain to the nailbeds of her 3rd and 4th digits  Pt states her right hand was caught in a door yesterday and she has pain at her DIP and PIP of both fingers as well as bruising underneath her nailbeds  Negative XR done at urgent care  Neurovascularly in tact distally  None       Past Medical History:   Diagnosis Date    Dehydration     Last assessed: 1/14/2015    Vesicoureteral reflux        Past Surgical History:   Procedure Laterality Date    NO PAST SURGERIES         Family History   Problem Relation Age of Onset    Hyperlipidemia Mother     Hypothyroidism Mother     No Known Problems Father     Diabetes Family     Thyroid disease Family      I have reviewed and agree with the history as documented  Social History     Tobacco Use    Smoking status: Never Smoker    Smokeless tobacco: Never Used   Substance Use Topics    Alcohol use: Yes     Comment: social    Drug use: No        Review of Systems   Constitutional: Negative for chills, diaphoresis and fever  Respiratory: Negative for shortness of breath  Cardiovascular: Negative for chest pain  Musculoskeletal: Positive for arthralgias  Negative for joint swelling  Skin: Positive for color change  Negative for wound  Physical Exam  Physical Exam   Constitutional: She is oriented to person, place, and time  She appears well-developed and well-nourished  No distress  HENT:   Head: Normocephalic and atraumatic  Eyes: Conjunctivae and EOM are normal    Neck: Normal range of motion  Neck supple  Cardiovascular: Normal rate, regular rhythm, normal heart sounds and intact distal pulses     Pulmonary/Chest: Effort normal and breath sounds normal  Musculoskeletal: Normal range of motion  Subungual hematomas present 3rd and 4th digits right hand  Tenderness of DIP and PIP of both digits with decreased ROM and strength  Neurovascularly in tact distally  Neurological: She is alert and oriented to person, place, and time  Skin: Skin is warm and dry  Capillary refill takes less than 2 seconds  She is not diaphoretic  Vital Signs  ED Triage Vitals [09/20/19 2022]   Temperature Pulse Respirations Blood Pressure SpO2   98 °F (36 7 °C) 92 16 148/93 98 %      Temp src Heart Rate Source Patient Position - Orthostatic VS BP Location FiO2 (%)   -- Monitor Sitting Left arm --      Pain Score       4           Vitals:    09/20/19 2022   BP: 148/93   Pulse: 92   Patient Position - Orthostatic VS: Sitting         Visual Acuity      ED Medications  Medications - No data to display    Diagnostic Studies  Results Reviewed     None                 No orders to display              Procedures  General Procedure  Date/Time: 9/21/2019 3:12 AM  Performed by: Dorita Mendoza PA-C  Authorized by: Dorita Mednoza PA-C     Patient location:  ED  Assisting Provider(s): Yes (comment) (Student)    Consent:     Consent obtained:  Verbal    Consent given by:  Patient    Risks discussed:  Bleeding, pain and incomplete drainage    Alternatives discussed:  No treatment  Indications:     Indications:  Subungual hematoma  Anesthesia (see MAR for exact dosages): Anesthesia method:  None  Procedure Detail:     Procedure note (site, laterality, method, findings):  Electrocautery trephination of right 3rd and 4th digit subungual hematoma  Cautery placed at 90 degree angle to drain hematoma  Successful drainage of both digits  Post-procedure details:     Patient tolerance of procedure:   Tolerated well, no immediate complications           ED Course                               MDM  Number of Diagnoses or Management Options  Subungual hematoma of finger of right hand, initial encounter:   Diagnosis management comments: Subungual hematomas drained successfully  Negative XR done via Epic  Educated on return precautions  Disposition  Final diagnoses:   Subungual hematoma of finger of right hand, initial encounter     Time reflects when diagnosis was documented in both MDM as applicable and the Disposition within this note     Time User Action Codes Description Comment    9/20/2019  9:46 PM Edgar Valenzuela Add [S60 [id number]] Subungual hematoma of finger of right hand, initial encounter       ED Disposition     ED Disposition Condition Date/Time Comment    Discharge Good Fri Sep 20, 2019  9:46 PM Marc Haines discharge to home/self care  Follow-up Information     Follow up With Specialties Details Why Contact Info    Dung Frederick, DO Family Medicine Schedule an appointment as soon as possible for a visit  As needed [facility] [address]  [address] [place] [address]  [phone]            There are no discharge medications for this patient  No discharge procedures on file      ED Provider  Electronically Signed by           Heaven Araujo PA-C  09/21/19 5329

## 2019-09-21 NOTE — DISCHARGE INSTRUCTIONS
Hematoma   WHAT YOU NEED TO KNOW:   A hematoma is a collection of blood  A bruise is a type of hematoma  A hematoma may form in a muscle or in the tissues just under the skin  A hematoma that forms under the skin will feel like a bump or hard mass  Hematomas can happen anywhere in your body, including in your brain  Your body may break down and absorb a mild hematoma on its own  A more serious hematoma may need treatment  DISCHARGE INSTRUCTIONS:   Medicines: You may need any of the following:  · Prescription pain medicine  may be given  Ask how to take this medicine safely  · NSAIDs , such as ibuprofen, help decrease swelling, pain, and fever  This medicine is available with or without a doctor's order  NSAIDs can cause stomach bleeding or kidney problems in certain people  If you take blood thinner medicine, always ask your healthcare provider if NSAIDs are safe for you  Always read the medicine label and follow directions  · Antibiotics  prevent or treat a bacterial infection  · Take your medicine as directed  Contact your healthcare provider if you think your medicine is not helping or if you have side effects  Tell him of her if you are allergic to any medicine  Keep a list of the medicines, vitamins, and herbs you take  Include the amounts, and when and why you take them  Bring the list or the pill bottles to follow-up visits  Carry your medicine list with you in case of an emergency  Return to the emergency department if:   · You have new or worsening pain, or pain that does not get better with medicine  · You have a fever  · You have trouble moving the body part that has the hematoma  Contact your healthcare provider if:   · You have questions or concerns about your condition or care  Follow up with your healthcare provider as directed: You may need to have surgery if your hematoma is severe   You may also need other tests to make sure there is no other damage that needs to be treated  Write down your questions so you remember to ask them during your visits  Self-care:   · Rest the area  Rest will help your body heal and will also help prevent more damage  · Apply ice as directed  Ice helps reduce swelling  Ice may also help prevent tissue damage  Use an ice pack, or put crushed ice in a bag  Cover it with a towel  Place it on your hematoma for 20 minutes every hour, or as directed  Ask how many times each day to apply ice, and for how many days  · Compress the injury if possible  Lightly wrap the injury with an elastic or soft bandage  This may help control swelling  Ask your healthcare provider how to wrap your injury properly  · Elevate the area as directed  If possible, raise the area above the level of your heart as often as you can  This will help decrease swelling  · Keep the hematoma covered with a bandage  This will help protect the area while it heals  © 2017 [employer] Information is for End User's use only and may not be sold, redistributed or otherwise used for commercial purposes  All illustrations and images included in CareNotes® are the copyrighted property of A D A M , Inc  or Joe Bermude[employer]  The above information is an  only  It is not intended as medical advice for individual conditions or treatments  Talk to your doctor, nurse or pharmacist before following any medical regimen to see if it is safe and effective for you  Finger Sprain   WHAT YOU NEED TO KNOW:   A finger sprain happens when ligaments in your finger or thumb are stretched or torn  Ligaments are the tough tissues that connect bones  Ligaments allow your hands to grasp and pinch  DISCHARGE INSTRUCTIONS:   Return to the emergency department if:   · The skin on your injured finger looks bluish or pale (less color than normal)  · You have new weakness or numbness in your finger or thumb  It may tingle or burn       · You have a splint that you cannot adjust and it feels too tight  Contact your healthcare provider if:   · You have new or increased swelling or pain in your finger  · You have new or increased stiffness when you move your injured finger  · You have questions or concerns about your injury or treatment  Medicines:   · Pain medicine  may be given  Do not wait until the pain is severe before taking your medicine  · Take your medicine as directed  Call your healthcare provider if you think your medicines are not helping or if you have side effects  Tell him if you take vitamins, herbs, or any other medicines  Keep a written list of your medicines  Include the amounts, and when and why you take them  Bring the list or the pill bottles to follow-up visits  Care for your finger:   · Rest  your finger for at least 48 hours  Do not do activities that cause pain  Return to normal activities as directed  · Apply ice  on your finger to help decrease pain and swelling  Put crushed ice in a plastic bag and cover it with a towel  Put the ice on your injured finger or thumb every hour for 15 to 20 minutes at a time  You may need to ice the area at least 4 to 8 times each day  Ice your finger for as many days as directed  · Elevate your finger  above the level of your heart as often as you can  This will help decrease swelling and pain  You can elevate your hand by resting it on a pillow  · Use a splint or compression as directed  Compression (tight hold) helps support your finger or thumb as it heals  Tape your injured finger to the finger beside it  Severe sprains may be treated with a splint  A splint prevents your finger from moving while it heals  Ask how long you must wear the splint or tape, and how to apply them  · Do exercises as directed  You may be given gentle exercises to begin in a few days  Exercises can help decrease stiffness in your finger or thumb   Exercises also help decrease pain and swelling and improve the movement of your finger or thumb  Check with your healthcare provider before you return to your normal activities or sports  Follow up with your healthcare provider as directed:  Write down any questions you may have to ask at your follow up visits  © 2017 [employer] Information is for End User's use only and may not be sold, redistributed or otherwise used for commercial purposes  All illustrations and images included in CareNotes® are the copyrighted property of A D A M , Inc  or Joe Bermudez  The above information is an  only  It is not intended as medical advice for individual conditions or treatments  Talk to your doctor, nurse or pharmacist before following any medical regimen to see if it is safe and effective for you

## 2019-09-24 ENCOUNTER — APPOINTMENT (OUTPATIENT)
Dept: URGENT CARE | Facility: MEDICAL CENTER | Age: 19
End: 2019-09-24
Payer: OTHER MISCELLANEOUS

## 2019-09-24 PROCEDURE — 99213 OFFICE O/P EST LOW 20 MIN: CPT | Performed by: FAMILY MEDICINE

## 2019-10-16 ENCOUNTER — TELEPHONE (OUTPATIENT)
Dept: FAMILY MEDICINE CLINIC | Facility: CLINIC | Age: 19
End: 2019-10-16

## 2019-10-16 NOTE — TELEPHONE ENCOUNTER
Review vomiting protocol  Review brat diet  No solids  If patient is continuing with vomiting and diarrhea, and really is not able to keep anything down (clear liquids, Gatorade, clear juices, flat soda Sprite) it despite taking Zofran?, then she may need to be admitted for continued risk of dehydration  Was there any recent travel out of the country?

## 2019-10-16 NOTE — TELEPHONE ENCOUNTER
Patient aware, given all instructions and protocols  I will call her later in the day and check in with her  She has not traveled out of the country at all  She knows to call or present to emergency department if any worsening symptoms

## 2019-10-16 NOTE — TELEPHONE ENCOUNTER
Patient's grandmother, Sudhir Simmons, called ([phone]) telling me patient was sick  I called Koki Broderick to speak to her directly  She started on 10/13/19 with a severe headache and fever to 100-101  Has no energey, nausea, vomiting, diarrhea, eyes puffy/swollen  Denies abdominal pain, cough, congestion, sneezing, rash  Was seen at [facility] yesterday (10/15/19)  Flu and strep negative, WBC's low  Given IV fluids for dehydration, prescribed Zofran and Amoxil for presumed sinus infection  Patient is not taking the Amoxil  She is taking Tylenol  Asking what she should do at this point? Has "never felt this bad"

## 2020-01-01 NOTE — PSYCH
Progress Note  Psychotherapy Provided [facility]: Individual Psychotherapy 50 minutes provided today  Goals addressed in session:   1-3D: P: Pt was seen for Individual Therapy session  Pt spoke today re: her feelings of disbelief again today re: her trauma history  She continues to struggle with "believing that it really happened  Instances of trauma in her past were again reviewed and damage to various areas of the brain were explored  A: Phillip Pedroza continues to minimize her trauma as a defense mechanism  However, she is more stable at this point emotionally  P: Additional sessions were scheduled during which the above will continue to be addressed  Pain Scale and Suicide Risk [facility]: Current Pain Assessment: no pain   Current suicide risk is low   Behavioral Health Treatment Plan [facility]: Diagnosis and Treatment Plan explained to patient, patient relates understanding diagnosis and is agreeable to Treatment Plan  Assessment    1  Moderate mood disorder (296 90) (F39)   2   Generalized anxiety disorder (300 02) (F41 1)    Signatures   Electronically signed by : Elena Lord LCSW; Jun 23 2016  9:58AM EST                       (Author) PEDIATRIC GENERAL SURGERY NICU PROGRESS NOTE    OXANA ZULETA  |  [id number]   |   [facility] [address]   |       Subjective: Patient seen and examined at bedside this morning. S/p laparoscopic pyloromyotomy yesterday for pyloric stenosis. No fevers overnight. 4-5 episodes of coughing/sneezing fits including one period of 20 minutes with desaturations to high 80s that resolved with upright positioning/being held. Tolerated pedialyte and formula feeds. Making diapers.    Objective:   Vital Signs Last 24 Hrs  T(C): 37 (25 Sep 2020 02:00), Max: 37.2 (24 Sep 2020 10:02)  T(F): 98.6 (25 Sep 2020 02:00), Max: 98.9 (24 Sep 2020 10:02)  HR: 139 (25 Sep 2020 02:00) (117 - 163)  BP: 85/56 (25 Sep 2020 02:00) (85/56 - 98/66)  BP(mean): 68 (24 Sep 2020 13:30) (68 - 70)  RR: 36 (25 Sep 2020 02:00) (21 - 45)  SpO2: 100% (25 Sep 2020 02:00) (97% - 100%)    PHYSICAL EXAM:  Gen: NAD, well-appearing, well-developed  Resp: Moving air comfortably, strong cry with some wet consistency  Abd: Soft, appropriately tender, nondistended incisions c/d/i  Ext: WWP, moving all extremities vigorously    LABS:    09-24    139  |  106  |  4<L>  ----------------------------<  66<L>  4.6   |  19<L>  |  0.32    Ca    10.0      24 Sep 2020 08:05        INTAKE/OUTPUT:    09-23-20 @ 07:01  -  09-24-20 @ 07:00  --------------------------------------------------------  IN: 415 mL / OUT: 481 mL / NET: -66 mL    09-24-20 @ 07:01  -  09-25-20 @ 03:10  --------------------------------------------------------  IN: 598 mL / OUT: 383 mL / NET: 215 mL        IMAGING STUDIES: PEDIATRIC GENERAL SURGERY NICU PROGRESS NOTE    OXANA ZULETA  |  [id number]   |   [facility]   |       Subjective: Patient seen and examined at bedside this morning. S/p laparoscopic pyloromyotomy yesterday for pyloric stenosis. No fevers overnight. 4-5 episodes of coughing/sneezing fits including one period of 20 minutes with desaturations to high 80s that resolved with upright positioning/being held. Tolerated pedialyte and formula feeds. Making diapers.    Objective:   Vital Signs Last 24 Hrs  T(C): 37 (25 Sep 2020 02:00), Max: 37.2 (24 Sep 2020 10:02)  T(F): 98.6 (25 Sep 2020 02:00), Max: 98.9 (24 Sep 2020 10:02)  HR: 139 (25 Sep 2020 02:00) (117 - 163)  BP: 85/56 (25 Sep 2020 02:00) (85/56 - 98/66)  BP(mean): 68 (24 Sep 2020 13:30) (68 - 70)  RR: 36 (25 Sep 2020 02:00) (21 - 45)  SpO2: 100% (25 Sep 2020 02:00) (97% - 100%)    PHYSICAL EXAM:  Gen: NAD, well-appearing, well-developed  Resp: Moving air comfortably, strong cry with some wet consistency  Abd: Soft, appropriately tender, nondistended incisions c/d/i  Ext: WWP, moving all extremities vigorously    LABS:    09-24    139  |  106  |  4<L>  ----------------------------<  66<L>  4.6   |  19<L>  |  0.32    Ca    10.0      24 Sep 2020 08:05        INTAKE/OUTPUT:    09-23-20 @ 07:01  -  09-24-20 @ 07:00  --------------------------------------------------------  IN: 415 mL / OUT: 481 mL / NET: -66 mL    09-24-20 @ 07:01  -  09-25-20 @ 03:10  --------------------------------------------------------  IN: 598 mL / OUT: 383 mL / NET: 215 mL

## 2020-05-13 ENCOUNTER — TELEMEDICINE (OUTPATIENT)
Dept: FAMILY MEDICINE CLINIC | Facility: CLINIC | Age: 20
End: 2020-05-13
Payer: COMMERCIAL

## 2020-05-13 ENCOUNTER — TELEPHONE (OUTPATIENT)
Dept: OTHER | Facility: OTHER | Age: 20
End: 2020-05-13

## 2020-05-13 DIAGNOSIS — R68.83 CHILLS: ICD-10-CM

## 2020-05-13 DIAGNOSIS — Z20.828 EXPOSURE TO SARS-ASSOCIATED CORONAVIRUS: Primary | ICD-10-CM

## 2020-05-13 DIAGNOSIS — R50.9 FEVER, UNSPECIFIED FEVER CAUSE: ICD-10-CM

## 2020-05-13 DIAGNOSIS — Z20.828 EXPOSURE TO SARS-ASSOCIATED CORONAVIRUS: ICD-10-CM

## 2020-05-13 PROCEDURE — U0003 INFECTIOUS AGENT DETECTION BY NUCLEIC ACID (DNA OR RNA); SEVERE ACUTE RESPIRATORY SYNDROME CORONAVIRUS 2 (SARS-COV-2) (CORONAVIRUS DISEASE [COVID-19]), AMPLIFIED PROBE TECHNIQUE, MAKING USE OF HIGH THROUGHPUT TECHNOLOGIES AS DESCRIBED BY CMS-2020-01-R: HCPCS

## 2020-05-13 PROCEDURE — 99214 OFFICE O/P EST MOD 30 MIN: CPT | Performed by: FAMILY MEDICINE

## 2020-05-14 ENCOUNTER — TELEPHONE (OUTPATIENT)
Dept: FAMILY MEDICINE CLINIC | Facility: CLINIC | Age: 20
End: 2020-05-14

## 2020-05-14 LAB — SARS-COV-2 RNA SPEC QL NAA+PROBE: NOT DETECTED

## 2020-10-21 ENCOUNTER — TELEPHONE (OUTPATIENT)
Dept: FAMILY MEDICINE CLINIC | Facility: CLINIC | Age: 20
End: 2020-10-21

## 2020-11-18 ENCOUNTER — TELEPHONE (OUTPATIENT)
Dept: PSYCHIATRY | Facility: CLINIC | Age: 20
End: 2020-11-18

## 2020-12-24 ENCOUNTER — OFFICE VISIT (OUTPATIENT)
Dept: FAMILY MEDICINE CLINIC | Facility: CLINIC | Age: 20
End: 2020-12-24
Payer: COMMERCIAL

## 2020-12-24 VITALS
DIASTOLIC BLOOD PRESSURE: 82 MMHG | OXYGEN SATURATION: 98 % | WEIGHT: 240 LBS | TEMPERATURE: 98.8 F | RESPIRATION RATE: 18 BRPM | BODY MASS INDEX: 37.67 KG/M2 | HEIGHT: 67 IN | SYSTOLIC BLOOD PRESSURE: 132 MMHG | HEART RATE: 72 BPM

## 2020-12-24 DIAGNOSIS — F41.1 GENERALIZED ANXIETY DISORDER: Primary | ICD-10-CM

## 2020-12-24 DIAGNOSIS — F51.04 PSYCHOPHYSIOLOGICAL INSOMNIA: ICD-10-CM

## 2020-12-24 PROCEDURE — 1036F TOBACCO NON-USER: CPT | Performed by: NURSE PRACTITIONER

## 2020-12-24 PROCEDURE — 3008F BODY MASS INDEX DOCD: CPT | Performed by: NURSE PRACTITIONER

## 2020-12-24 PROCEDURE — 99214 OFFICE O/P EST MOD 30 MIN: CPT | Performed by: NURSE PRACTITIONER

## 2020-12-24 RX ORDER — BUSPIRONE HYDROCHLORIDE 5 MG/1
TABLET ORAL
Qty: 60 TABLET | Refills: 0 | Status: SHIPPED | OUTPATIENT
Start: 2020-12-24 | End: 2021-01-19

## 2021-01-18 DIAGNOSIS — F41.1 GENERALIZED ANXIETY DISORDER: ICD-10-CM

## 2021-01-19 RX ORDER — BUSPIRONE HYDROCHLORIDE 5 MG/1
5 TABLET ORAL 2 TIMES DAILY
Qty: 60 TABLET | Refills: 1 | Status: SHIPPED | OUTPATIENT
Start: 2021-01-19 | End: 2021-02-09 | Stop reason: SDUPTHER

## 2021-01-21 ENCOUNTER — SOCIAL WORK (OUTPATIENT)
Dept: BEHAVIORAL/MENTAL HEALTH CLINIC | Facility: CLINIC | Age: 21
End: 2021-01-21
Payer: COMMERCIAL

## 2021-01-21 DIAGNOSIS — F31.63 BIPOLAR DISORDER, CURRENT EPISODE MIXED, SEVERE, WITHOUT PSYCHOTIC FEATURES (HCC): ICD-10-CM

## 2021-01-21 DIAGNOSIS — F43.10 PTSD (POST-TRAUMATIC STRESS DISORDER): ICD-10-CM

## 2021-01-21 DIAGNOSIS — F40.10 SOCIAL ANXIETY DISORDER: ICD-10-CM

## 2021-01-21 PROCEDURE — 90791 PSYCH DIAGNOSTIC EVALUATION: CPT | Performed by: SOCIAL WORKER

## 2021-01-21 NOTE — BH TREATMENT PLAN
Linda Haines  2000       Date of Initial Treatment Plan: 01/21/21   Date of Current Treatment Plan: 01/21/21    Treatment Plan Number 1    Strengths/Personal Resources for Self Care: Insightful, motivated  Diagnosis:   1  Bipolar disorder, current episode mixed, severe, without psychotic features ([facility] [address] )     2  PTSD (post-traumatic stress disorder)     3  Social anxiety disorder         Area of Needs: I don't know who I am or what I want in life  Long Term Goal 1: AI want coping skills to manage my anxiety  Target Date: 7/21/21  Completion Date: N/A         Short Term Objectives for Goal 1: AI will continue to journal  , BI will learn and practice ways to challenge my anxiety  and CI will learn about mindfulness  GOAL 1: Modality: Individual 4x per month   Completion Date tbd and The person(s) responsible for carrying out the plan is  Vinicius Diamond[address]: Diagnosis and Treatment Plan explained to Fredy Villegas relates understanding diagnosis and is agreeable to Treatment Plan         Client Comments : Please share your thoughts, feelings, need and/or experiences regarding your treatment plan:     Treatment Plan done but not signed at time of office visit due to:  Plan reviewed by phone or in person  and verbal consent given due to [place] social distancing
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
## 2021-02-09 DIAGNOSIS — F41.1 GENERALIZED ANXIETY DISORDER: ICD-10-CM

## 2021-02-09 RX ORDER — BUSPIRONE HYDROCHLORIDE 5 MG/1
5 TABLET ORAL 2 TIMES DAILY
Qty: 60 TABLET | Refills: 1 | Status: SHIPPED | OUTPATIENT
Start: 2021-02-09 | End: 2021-03-29 | Stop reason: SDUPTHER

## 2021-02-09 NOTE — TELEPHONE ENCOUNTER
Patient asking for a refill of the Buspar  Please send to the [facility] on [address] in [facility]  Thank you!

## 2021-02-16 ENCOUNTER — OFFICE VISIT (OUTPATIENT)
Dept: FAMILY MEDICINE CLINIC | Facility: CLINIC | Age: 21
End: 2021-02-16
Payer: COMMERCIAL

## 2021-02-16 VITALS
OXYGEN SATURATION: 98 % | HEIGHT: 67 IN | SYSTOLIC BLOOD PRESSURE: 118 MMHG | BODY MASS INDEX: 39.55 KG/M2 | DIASTOLIC BLOOD PRESSURE: 88 MMHG | TEMPERATURE: 98.3 F | RESPIRATION RATE: 16 BRPM | HEART RATE: 90 BPM | WEIGHT: 252 LBS

## 2021-02-16 DIAGNOSIS — F51.04 PSYCHOPHYSIOLOGICAL INSOMNIA: ICD-10-CM

## 2021-02-16 DIAGNOSIS — F41.1 GENERALIZED ANXIETY DISORDER: Primary | ICD-10-CM

## 2021-02-16 PROCEDURE — 99214 OFFICE O/P EST MOD 30 MIN: CPT | Performed by: NURSE PRACTITIONER

## 2021-02-16 PROCEDURE — 1036F TOBACCO NON-USER: CPT | Performed by: NURSE PRACTITIONER

## 2021-02-16 PROCEDURE — 3008F BODY MASS INDEX DOCD: CPT | Performed by: NURSE PRACTITIONER

## 2021-02-16 NOTE — PATIENT INSTRUCTIONS
Continue with 5 mg twice a day, work on getting a pill box, try to be diligent to take the medication twice a day every day  Please call for any changes/ symptoms  Please call the office if you are experiencing any worsening of symptoms or no symptom improvement  Anxiety   AMBULATORY CARE:   Anxiety  is a condition that causes you to feel extremely worried or nervous  The feelings are so strong that they can cause problems with your daily activities or sleep  Anxiety may be triggered by something you fear, or it may happen without a cause  Family or work stress, smoking, caffeine, and alcohol can increase your risk for anxiety  Certain medicines or health conditions can also increase your risk  Anxiety can become a long-term condition if it is not managed or treated  Common signs and symptoms that may occur with anxiety:   · Fatigue or muscle tightness    · Shaking, restlessness, or irritability    · Problems focusing    · Trouble sleeping    · Feeling jumpy, easily startled, or dizzy    · Rapid heartbeat or shortness of breath    Call your local emergency number (911 in the [address] [facility] [facility]) if:   · You have chest pain, tightness, or heaviness that may spread to your shoulders, arms, jaw, neck, or back  · You feel like hurting yourself or someone else  Call your doctor if:   · Your symptoms get worse or do not get better with treatment  · Your anxiety keeps you from doing your regular daily activities  · You have new symptoms since your last visit  · You have questions or concerns about your condition or care  Treatment for anxiety  may include medicines to help you feel calm and relaxed, and decrease your symptoms  Medicines are usually given together with therapy or other treatments  Manage anxiety:   · Talk to someone about your anxiety  Your healthcare provider may suggest counseling   Cognitive behavioral therapy can help you understand and change how you react to events that trigger your symptoms  You might feel more comfortable talking with a friend or family member about your anxiety  Choose someone you know will be supportive and encouraging  · Find ways to relax  Activities such as exercise, meditation, or listening to music can help you relax  Spend time with friends, or do things you enjoy  · Practice deep breathing  Deep breathing can help you relax when you feel anxious  Focus on taking slow, deep breaths several times a day, or during an anxiety attack  Breathe in through your nose and out through your mouth  · Create a regular sleep routine  Regular sleep can help you feel calmer during the day  Go to sleep and wake up at the same times every day  Do not watch television or use the computer right before bed  Your room should be comfortable, dark, and quiet  · Eat a variety of healthy foods  Healthy foods include fruits, vegetables, low-fat dairy products, lean meats, fish, whole-grain breads, and cooked beans  Healthy foods can help you feel less anxious and have more energy  · Exercise regularly  Exercise can increase your energy level  Exercise may also lift your mood and help you sleep better  Your healthcare provider can help you create an exercise plan  · Do not smoke  Nicotine and other chemicals in cigarettes and cigars can increase anxiety  Ask your healthcare provider for information if you currently smoke and need help to quit  E-cigarettes or smokeless tobacco still contain nicotine  Talk to your healthcare provider before you use these products  · Do not have caffeine  Caffeine can make your symptoms worse  Do not have foods or drinks that are meant to increase your energy level  · Limit or do not drink alcohol  Ask your healthcare provider if alcohol is safe for you  You may not be able to drink alcohol if you take certain anxiety or depression medicines  Limit alcohol to 1 drink per day if you are a woman   Limit alcohol to 2 drinks per day if you are a man  A drink of alcohol is 12 ounces of beer, 5 ounces of wine, or 1½ ounces of liquor  · Do not use drugs  Drugs can make your anxiety worse  It can also make anxiety hard to manage  Talk to your healthcare provider if you use drugs and want help to quit  Follow up with your doctor within 2 weeks or as directed:  Write down your questions so you remember to ask them during your visits  © Copyright [address] Information is for End User's use only and may not be sold, redistributed or otherwise used for commercial purposes  All illustrations and images included in CareNotes® are the copyrighted property of A D A M , Inc  or [address] [place] [address]  The above information is an  only  It is not intended as medical advice for individual conditions or treatments  Talk to your doctor, nurse or pharmacist before following any medical regimen to see if it is safe and effective for you

## 2021-02-16 NOTE — PROGRESS NOTES
Assessment/Plan:   Diagnosis ICD-10-CM Associated Orders   1  Generalized anxiety disorder  F41 1 TSH, 3rd generation with Free T4 reflex     Comprehensive metabolic panel     CBC and differential     Vitamin D 25 hydroxy   2  Psychophysiological insomnia  F51 04 TSH, 3rd generation with Free T4 reflex     Comprehensive metabolic panel     CBC and differential     Vitamin D 25 hydroxy   3  BMI 39 0-39 9,adult  Z68 39 TSH, 3rd generation with Free T4 reflex     Comprehensive metabolic panel     CBC and differential     Lipid panel     Vitamin D 25 hydroxy       ZOE/ Insomnia  Moderately improved symptoms  She does not wish to increase medication dose at this time  Headaches likely from missed doses  Discussed important of 100% medication adherence,she will get a pill box to help her  If she has no improvement in headaches/ mood changes after 4 weeks of good adherence then she is to notify me  Will also update labs  Please call the office if you are experiencing any worsening of symptoms or no symptom improvement  Advised to call the office for any worsening of symptoms or no symptom improvement  Patient verbalizes understand and agrees with treatment plan  Diagnoses and all orders for this visit:    Generalized anxiety disorder  -     TSH, 3rd generation with Free T4 reflex; Future  -     Comprehensive metabolic panel; Future  -     CBC and differential; Future  -     Vitamin D 25 hydroxy; Future    Psychophysiological insomnia  -     TSH, 3rd generation with Free T4 reflex; Future  -     Comprehensive metabolic panel; Future  -     CBC and differential; Future  -     Vitamin D 25 hydroxy; Future    BMI 39 0-39 9,adult  -     TSH, 3rd generation with Free T4 reflex; Future  -     Comprehensive metabolic panel; Future  -     CBC and differential; Future  -     Lipid panel; Future  -     Vitamin D 25 hydroxy; Future                Subjective:        Patient ID: Cris Puckett is a 23 y o  female    Chief Complaint   Patient presents with    Follow-up     1 month followup for medication        Here for anxiety follow up, she started buspar 5 mg BID about one month ago  She states she has been feeling much better  She has noted large improvement in anxiety as well as her sleep, she said she is now getting about 8 hours of interrupted sleep  She reports having a hard time remembering to take the medication sometimes and will get some headaches  She states for the first two weeks she didn't miss a dose and didn't have headaches at that time  She states she feels some mood swings as well and has a mix of good days/ and bad days where she feels tired/unmotivated  She states prior to starting medication she was only having those bad days  Denies SI/HI  The following portions of the patient's history were reviewed and updated as appropriate: allergies, current medications, past family history, past social history and problem list     Review of Systems   Constitutional: Negative for chills and fever  Eyes: Negative for discharge  Respiratory: Negative for shortness of breath  Cardiovascular: Negative for chest pain  Gastrointestinal: Negative for constipation and diarrhea  Genitourinary: Negative for difficulty urinating  Musculoskeletal: Negative for joint swelling  Skin: Negative for rash  Neurological: Negative for headaches  Hematological: Negative for adenopathy  Psychiatric/Behavioral: The patient is not nervous/anxious  Objective:  /88   Pulse 90   Temp 98 3 °F (36 8 °C) (Temporal)   Resp 16   Ht 5' 7" (1 702 m)   Wt 114 kg (252 lb)   SpO2 98%   BMI 39 47 kg/m²      Physical Exam  Vitals signs and nursing note reviewed  Constitutional:       General: She is not in acute distress  Appearance: She is well-developed  She is obese  She is not diaphoretic  HENT:      Head: Normocephalic and atraumatic        Right Ear: External ear normal       Left Ear: External ear normal    Eyes:      General: Lids are normal          Right eye: No discharge  Left eye: No discharge  Conjunctiva/sclera: Conjunctivae normal    Neck:      Musculoskeletal: Neck supple  Cardiovascular:      Rate and Rhythm: Normal rate and regular rhythm  Heart sounds: No murmur  Pulmonary:      Effort: Pulmonary effort is normal  No respiratory distress  Breath sounds: Normal breath sounds  No wheezing  Musculoskeletal:         General: No deformity  Skin:     General: Skin is warm and dry  Neurological:      Mental Status: She is alert and oriented to person, place, and time  Psychiatric:         Speech: Speech normal          Behavior: Behavior normal          Thought Content: Thought content normal  Thought content does not include homicidal or suicidal ideation  Thought content does not include homicidal or suicidal plan  Judgment: Judgment normal            BMI Counseling: Body mass index is 39 47 kg/m²  The BMI is above normal  Nutrition recommendations include reducing intake of cholesterol  Exercise recommendations include exercising 3-5 times per week and strength training exercises             Current Outpatient Medications:     busPIRone (BUSPAR) 5 mg tablet, Take 1 tablet (5 mg total) by mouth 2 (two) times a day, Disp: 60 tablet, Rfl: 1  Allergies   Allergen Reactions    Bee Venom

## 2021-03-09 LAB
25(OH)D3 SERPL-MCNC: 14 NG/ML (ref 30–100)
ALBUMIN SERPL-MCNC: 4 G/DL (ref 3.6–5.1)
ALBUMIN/GLOB SERPL: 1.4 (CALC) (ref 1–2.5)
ALP SERPL-CCNC: 32 U/L (ref 31–125)
ALT SERPL-CCNC: 44 U/L (ref 6–29)
AST SERPL-CCNC: 30 U/L (ref 10–30)
BASOPHILS # BLD AUTO: 41 CELLS/UL (ref 0–200)
BASOPHILS NFR BLD AUTO: 0.4 %
BILIRUB SERPL-MCNC: 0.3 MG/DL (ref 0.2–1.2)
BUN SERPL-MCNC: 10 MG/DL (ref 7–25)
BUN/CREAT SERPL: ABNORMAL (CALC) (ref 6–22)
CALCIUM SERPL-MCNC: 9.2 MG/DL (ref 8.6–10.2)
CHLORIDE SERPL-SCNC: 105 MMOL/L (ref 98–110)
CHOLEST SERPL-MCNC: 172 MG/DL
CHOLEST/HDLC SERPL: 3.5 (CALC)
CO2 SERPL-SCNC: 23 MMOL/L (ref 20–32)
CREAT SERPL-MCNC: 0.65 MG/DL (ref 0.5–1.1)
EOSINOPHIL # BLD AUTO: 175 CELLS/UL (ref 15–500)
EOSINOPHIL NFR BLD AUTO: 1.7 %
ERYTHROCYTE [DISTWIDTH] IN BLOOD BY AUTOMATED COUNT: 14 % (ref 11–15)
GLOBULIN SER CALC-MCNC: 2.9 G/DL (CALC) (ref 1.9–3.7)
GLUCOSE SERPL-MCNC: 88 MG/DL (ref 65–99)
HCT VFR BLD AUTO: 39.8 % (ref 35–45)
HDLC SERPL-MCNC: 49 MG/DL
HGB BLD-MCNC: 13.8 G/DL (ref 11.7–15.5)
LDLC SERPL CALC-MCNC: 104 MG/DL (CALC)
LYMPHOCYTES # BLD AUTO: 3142 CELLS/UL (ref 850–3900)
LYMPHOCYTES NFR BLD AUTO: 30.5 %
MCH RBC QN AUTO: 29.5 PG (ref 27–33)
MCHC RBC AUTO-ENTMCNC: 34.7 G/DL (ref 32–36)
MCV RBC AUTO: 85 FL (ref 80–100)
MONOCYTES # BLD AUTO: 865 CELLS/UL (ref 200–950)
MONOCYTES NFR BLD AUTO: 8.4 %
NEUTROPHILS # BLD AUTO: 6077 CELLS/UL (ref 1500–7800)
NEUTROPHILS NFR BLD AUTO: 59 %
NONHDLC SERPL-MCNC: 123 MG/DL (CALC)
PLATELET # BLD AUTO: 338 THOUSAND/UL (ref 140–400)
PMV BLD REES-ECKER: 10.4 FL (ref 7.5–12.5)
POTASSIUM SERPL-SCNC: 4.5 MMOL/L (ref 3.5–5.3)
PROT SERPL-MCNC: 6.9 G/DL (ref 6.1–8.1)
RBC # BLD AUTO: 4.68 MILLION/UL (ref 3.8–5.1)
SL AMB EGFR AFRICAN AMERICAN: 146 ML/MIN/1.73M2
SL AMB EGFR NON AFRICAN AMERICAN: 126 ML/MIN/1.73M2
SODIUM SERPL-SCNC: 138 MMOL/L (ref 135–146)
TRIGL SERPL-MCNC: 99 MG/DL
TSH SERPL-ACNC: 1.86 MIU/L
WBC # BLD AUTO: 10.3 THOUSAND/UL (ref 3.8–10.8)

## 2021-03-15 ENCOUNTER — SOCIAL WORK (OUTPATIENT)
Dept: BEHAVIORAL/MENTAL HEALTH CLINIC | Facility: CLINIC | Age: 21
End: 2021-03-15
Payer: COMMERCIAL

## 2021-03-15 DIAGNOSIS — R74.8 ELEVATED LIVER ENZYMES: Primary | ICD-10-CM

## 2021-03-15 DIAGNOSIS — F31.63 BIPOLAR DISORDER, CURRENT EPISODE MIXED, SEVERE, WITHOUT PSYCHOTIC FEATURES (HCC): ICD-10-CM

## 2021-03-15 PROCEDURE — 90834 PSYTX W PT 45 MINUTES: CPT | Performed by: SOCIAL WORKER
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
## 2021-03-29 ENCOUNTER — SOCIAL WORK (OUTPATIENT)
Dept: BEHAVIORAL/MENTAL HEALTH CLINIC | Facility: CLINIC | Age: 21
End: 2021-03-29
Payer: COMMERCIAL

## 2021-03-29 DIAGNOSIS — F40.10 SOCIAL ANXIETY DISORDER: ICD-10-CM

## 2021-03-29 DIAGNOSIS — F43.10 PTSD (POST-TRAUMATIC STRESS DISORDER): ICD-10-CM

## 2021-03-29 DIAGNOSIS — F41.1 GENERALIZED ANXIETY DISORDER: ICD-10-CM

## 2021-03-29 DIAGNOSIS — F31.63 BIPOLAR DISORDER, CURRENT EPISODE MIXED, SEVERE, WITHOUT PSYCHOTIC FEATURES (HCC): ICD-10-CM

## 2021-03-29 PROCEDURE — 90834 PSYTX W PT 45 MINUTES: CPT | Performed by: SOCIAL WORKER
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
## 2021-03-30 RX ORDER — BUSPIRONE HYDROCHLORIDE 5 MG/1
5 TABLET ORAL 2 TIMES DAILY
Qty: 60 TABLET | Refills: 3 | Status: SHIPPED | OUTPATIENT
Start: 2021-03-30 | End: 2021-05-13 | Stop reason: SDUPTHER

## 2021-04-05 ENCOUNTER — SOCIAL WORK (OUTPATIENT)
Dept: BEHAVIORAL/MENTAL HEALTH CLINIC | Facility: CLINIC | Age: 21
End: 2021-04-05
Payer: COMMERCIAL

## 2021-04-05 DIAGNOSIS — F31.63 BIPOLAR DISORDER, CURRENT EPISODE MIXED, SEVERE, WITHOUT PSYCHOTIC FEATURES (HCC): ICD-10-CM

## 2021-04-05 DIAGNOSIS — F40.10 SOCIAL ANXIETY DISORDER: ICD-10-CM

## 2021-04-05 DIAGNOSIS — F43.10 PTSD (POST-TRAUMATIC STRESS DISORDER): ICD-10-CM

## 2021-04-05 PROCEDURE — 90834 PSYTX W PT 45 MINUTES: CPT | Performed by: SOCIAL WORKER
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
## 2021-04-26 ENCOUNTER — TELEPHONE (OUTPATIENT)
Dept: FAMILY MEDICINE CLINIC | Facility: CLINIC | Age: 21
End: 2021-04-26

## 2021-05-13 DIAGNOSIS — F41.1 GENERALIZED ANXIETY DISORDER: ICD-10-CM

## 2021-05-13 RX ORDER — BUSPIRONE HYDROCHLORIDE 5 MG/1
5 TABLET ORAL 3 TIMES DAILY
Qty: 90 TABLET | Refills: 0 | Status: SHIPPED | OUTPATIENT
Start: 2021-05-13 | End: 2021-07-28 | Stop reason: SDUPTHER

## 2021-05-17 ENCOUNTER — TELEPHONE (OUTPATIENT)
Dept: FAMILY MEDICINE CLINIC | Facility: CLINIC | Age: 21
End: 2021-05-17

## 2021-05-17 DIAGNOSIS — S83.412A COMPLETE TEAR OF MEDIAL COLLATERAL LIGAMENT OF LEFT KNEE, INITIAL ENCOUNTER: Primary | ICD-10-CM

## 2021-05-17 DIAGNOSIS — M25.562 ACUTE PAIN OF LEFT KNEE: ICD-10-CM

## 2021-05-17 NOTE — TELEPHONE ENCOUNTER
MRI will not likely be covered until patient has a baseline knee x-ray  It appears that the knee was not x-rayed at urgent care  Need to get x-ray done today and then I can order MRI

## 2021-05-17 NOTE — TELEPHONE ENCOUNTER
Spoke with patient she states she did have x-ray done at urgent care she is contacting urgent care to have them forward xray results to Dr Kirby Parada

## 2021-05-17 NOTE — TELEPHONE ENCOUNTER
Left voicemail message for patient to return call  Terbinafine Counseling: Patient counseling regarding adverse effects of terbinafine including but not limited to headache, diarrhea, rash, upset stomach, liver function test abnormalities, itching, taste/smell disturbance, nausea, abdominal pain, and flatulence.  There is a rare possibility of liver failure that can occur when taking terbinafine.  The patient understands that a baseline LFT and kidney function test may be required. The patient verbalized understanding of the proper use and possible adverse effects of terbinafine.  All of the patient's questions and concerns were addressed.

## 2021-05-17 NOTE — TELEPHONE ENCOUNTER
Patient went to urgent care on 05/12/21 and states doctor told her she might have a torn MCL in left knee and needs MRI patient would like if you may please place order for MRI

## 2021-05-17 NOTE — TELEPHONE ENCOUNTER
Tell her never minD  I do finally see the x-ray report  It literally was not there 3 hours ago  So we will order MRI and see if insurance will approve it

## 2021-05-20 ENCOUNTER — TELEMEDICINE (OUTPATIENT)
Dept: FAMILY MEDICINE CLINIC | Facility: CLINIC | Age: 21
End: 2021-05-20
Payer: COMMERCIAL

## 2021-05-20 ENCOUNTER — TELEPHONE (OUTPATIENT)
Dept: FAMILY MEDICINE CLINIC | Facility: CLINIC | Age: 21
End: 2021-05-20

## 2021-05-20 DIAGNOSIS — T24.209A PARTIAL THICKNESS BURN OF LOWER EXTREMITY, UNSPECIFIED LATERALITY, INITIAL ENCOUNTER: ICD-10-CM

## 2021-05-20 DIAGNOSIS — F51.01 PRIMARY INSOMNIA: ICD-10-CM

## 2021-05-20 DIAGNOSIS — F41.1 GENERALIZED ANXIETY DISORDER: Primary | ICD-10-CM

## 2021-05-20 PROCEDURE — 99214 OFFICE O/P EST MOD 30 MIN: CPT | Performed by: NURSE PRACTITIONER

## 2021-05-20 NOTE — TELEPHONE ENCOUNTER
lmom requesting call back from pt  Oli Vang:Return in about 6 months (around 11/20/2021), or if symptoms worsen or fail to improve, for Recheck
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
## 2021-05-24 ENCOUNTER — SOCIAL WORK (OUTPATIENT)
Dept: BEHAVIORAL/MENTAL HEALTH CLINIC | Facility: CLINIC | Age: 21
End: 2021-05-24
Payer: COMMERCIAL

## 2021-05-24 DIAGNOSIS — F43.10 PTSD (POST-TRAUMATIC STRESS DISORDER): ICD-10-CM

## 2021-05-24 DIAGNOSIS — F40.10 SOCIAL ANXIETY DISORDER: ICD-10-CM

## 2021-05-24 DIAGNOSIS — F31.63 BIPOLAR DISORDER, CURRENT EPISODE MIXED, SEVERE, WITHOUT PSYCHOTIC FEATURES (HCC): ICD-10-CM

## 2021-05-24 PROCEDURE — 90834 PSYTX W PT 45 MINUTES: CPT | Performed by: SOCIAL WORKER
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
## 2021-05-28 ENCOUNTER — HOSPITAL ENCOUNTER (OUTPATIENT)
Dept: MRI IMAGING | Facility: HOSPITAL | Age: 21
Discharge: HOME | End: 2021-05-28
Payer: COMMERCIAL

## 2021-05-28 DIAGNOSIS — M25.562 ACUTE PAIN OF LEFT KNEE: ICD-10-CM

## 2021-05-28 DIAGNOSIS — S83.412A COMPLETE TEAR OF MEDIAL COLLATERAL LIGAMENT OF LEFT KNEE, INITIAL ENCOUNTER: ICD-10-CM

## 2021-05-28 PROCEDURE — G1004 CDSM NDSC: HCPCS

## 2021-05-28 PROCEDURE — 73721 MRI JNT OF LWR EXTRE W/O DYE: CPT

## 2021-06-02 ENCOUNTER — TELEPHONE (OUTPATIENT)
Dept: FAMILY MEDICINE CLINIC | Facility: CLINIC | Age: 21
End: 2021-06-02

## 2021-06-02 NOTE — TELEPHONE ENCOUNTER
Patient called requesting results of her MRI of her knee, also she would like an ambulatory referral for [facility]  Please advise patient at [phone]
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
## 2021-06-04 ENCOUNTER — TELEPHONE (OUTPATIENT)
Dept: FAMILY MEDICINE CLINIC | Facility: CLINIC | Age: 21
End: 2021-06-04

## 2021-06-04 NOTE — TELEPHONE ENCOUNTER
Okay-------- I do not believe the patient is an HMO so she can call and make the appointment herself  Her evaluation for the knee was done through [facility]  [facility]  Also MRI was also done through [facility]  Because [facility] we cannot put a direct referral into them

## 2021-06-04 NOTE — TELEPHONE ENCOUNTER
Patient would like MRI results  Patient states left knee is still bothering her and patient states she still cannot walk  Patient would also like referral to orthopedic doctor

## 2021-06-04 NOTE — TELEPHONE ENCOUNTER
Patient aware and will make appointment herself  Patient is also requesting Dr Liao to please give her a call due to that she has a question

## 2021-06-04 NOTE — TELEPHONE ENCOUNTER
I called Maya Foster she is aware that Dr Ramos does not need the disc of her recent imaging  Pt advised we have no way of viewing it  Pt advised it is up at  ready for  at her convenience

## 2021-06-04 NOTE — TELEPHONE ENCOUNTER
Dr Ramos physically handed me pt's MRI disk this needs to go back to the pt  I am to call pt and let her know that she can please come pick this up at her convenience

## 2021-06-07 ENCOUNTER — TELEPHONE (OUTPATIENT)
Dept: PSYCHIATRY | Facility: CLINIC | Age: 21
End: 2021-06-07

## 2021-06-07 ENCOUNTER — DOCUMENTATION (OUTPATIENT)
Dept: BEHAVIORAL/MENTAL HEALTH CLINIC | Facility: CLINIC | Age: 21
End: 2021-06-07

## 2021-06-07 DIAGNOSIS — M25.562 ACUTE PAIN OF LEFT KNEE: Primary | ICD-10-CM

## 2021-06-07 NOTE — TELEPHONE ENCOUNTER
----- Message from [facility] sent at 6/7/2021  4:42 PM EDT -----  Regarding: NS 6/7/21  Pt was marked as a no show for her visit with Monica Talbert on 6/7/21

## 2021-06-10 ENCOUNTER — CONSULT (OUTPATIENT)
Dept: OBGYN CLINIC | Facility: OTHER | Age: 21
End: 2021-06-10
Payer: COMMERCIAL

## 2021-06-10 VITALS
HEART RATE: 77 BPM | WEIGHT: 242.8 LBS | SYSTOLIC BLOOD PRESSURE: 115 MMHG | HEIGHT: 67 IN | BODY MASS INDEX: 38.11 KG/M2 | DIASTOLIC BLOOD PRESSURE: 78 MMHG

## 2021-06-10 DIAGNOSIS — S86.912A STRAIN OF LEFT KNEE, INITIAL ENCOUNTER: Primary | ICD-10-CM

## 2021-06-10 PROCEDURE — 3008F BODY MASS INDEX DOCD: CPT | Performed by: ORTHOPAEDIC SURGERY

## 2021-06-10 PROCEDURE — 99203 OFFICE O/P NEW LOW 30 MIN: CPT | Performed by: ORTHOPAEDIC SURGERY

## 2021-06-10 PROCEDURE — 1036F TOBACCO NON-USER: CPT | Performed by: ORTHOPAEDIC SURGERY
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
## 2021-06-22 ENCOUNTER — TELEMEDICINE (OUTPATIENT)
Dept: BEHAVIORAL/MENTAL HEALTH CLINIC | Facility: CLINIC | Age: 21
End: 2021-06-22
Payer: COMMERCIAL

## 2021-06-22 DIAGNOSIS — F40.10 SOCIAL ANXIETY DISORDER: ICD-10-CM

## 2021-06-22 DIAGNOSIS — F31.63 BIPOLAR DISORDER, CURRENT EPISODE MIXED, SEVERE, WITHOUT PSYCHOTIC FEATURES (HCC): ICD-10-CM

## 2021-06-22 DIAGNOSIS — F43.10 PTSD (POST-TRAUMATIC STRESS DISORDER): ICD-10-CM

## 2021-06-22 PROCEDURE — 90834 PSYTX W PT 45 MINUTES: CPT | Performed by: SOCIAL WORKER

## 2021-06-22 NOTE — PSYCH
Psychotherapy Provided: Individual Psychotherapy 50 minutes     Length of time in session: 50 minutes, follow up in 4 week    Goals addressed in session: Goal 1     Pain:  3    none      Current suicide risk : Low      D:  Met with Dara for her fifth Individual session following her return to therapy  Roel Dela Cruz shared that she has been very much enjoying time with her paramour over the past month as she continues to work and recover from her knee injury  A:  Roel Dela Cruz seems to be very active with friends, social activity at this time  She misses running, being able to work out  She does not appear to be overly stressed and is uncertain of her need to remain in biweekly therapy at this time  P:  Upcoming sessions will be used to support her through monthly sessions at this time  This note was not shared with the patient due to this is a psychotherapy note      [address]: Diagnosis and Treatment Plan explained to April Hansen relates understanding diagnosis and is agreeable to Treatment Plan  No    Encounter Diagnoses   Name Primary?     Bipolar disorder, current episode mixed, severe, without psychotic features ([facility] [address] )     Social anxiety disorder     PTSD (post-traumatic stress disorder)

## 2021-07-13 ENCOUNTER — TELEMEDICINE (OUTPATIENT)
Dept: BEHAVIORAL/MENTAL HEALTH CLINIC | Facility: CLINIC | Age: 21
End: 2021-07-13
Payer: COMMERCIAL

## 2021-07-13 DIAGNOSIS — F40.10 SOCIAL ANXIETY DISORDER: ICD-10-CM

## 2021-07-13 DIAGNOSIS — F31.63 BIPOLAR DISORDER, CURRENT EPISODE MIXED, SEVERE, WITHOUT PSYCHOTIC FEATURES (HCC): ICD-10-CM

## 2021-07-13 DIAGNOSIS — F43.10 PTSD (POST-TRAUMATIC STRESS DISORDER): ICD-10-CM

## 2021-07-13 PROCEDURE — 90834 PSYTX W PT 45 MINUTES: CPT | Performed by: SOCIAL WORKER

## 2021-07-13 NOTE — PSYCH
Psychotherapy Provided: Individual Psychotherapy 50 minutes     Length of time in session: 50 minutes, follow up in 2week    Goals addressed in session: Goal 1     Pain:  1  none      Current suicide risk : Low      D:  Met with Dara for Individual session during which she spoke about her father's impromptu visit several weeks ago and how upset this visit left her  Arsalan Garcia shared details from their conversation re: how "perfect" her father believes he has been to her throughout her life  She verbalized a desire to write him a letter in response to this following which she intends to terminate contact with him  A:  Arsalan Garcia also expressed concerns for / about her brother whom she has protected, cared for   But is now residing with her father  She was receptive to this worker's feedback and support today and also agreed to write a letter to him  Her sense of self is much stronger at this time  P:  Upcoming sessions will be used to support her through biweekly sessions at this time  This note was not shared with the patient due to this is a psychotherapy note      [address]: Diagnosis and Treatment Plan explained to Claudio Real relates understanding diagnosis and is agreeable to Treatment Plan  No    Encounter Diagnoses   Name Primary?     PTSD (post-traumatic stress disorder)     Social anxiety disorder     Bipolar disorder, current episode mixed, severe, without psychotic features ([facility] [address] )

## 2021-07-27 ENCOUNTER — SOCIAL WORK (OUTPATIENT)
Dept: BEHAVIORAL/MENTAL HEALTH CLINIC | Facility: CLINIC | Age: 21
End: 2021-07-27
Payer: COMMERCIAL

## 2021-07-27 DIAGNOSIS — F43.10 PTSD (POST-TRAUMATIC STRESS DISORDER): ICD-10-CM

## 2021-07-27 DIAGNOSIS — F31.63 BIPOLAR DISORDER, CURRENT EPISODE MIXED, SEVERE, WITHOUT PSYCHOTIC FEATURES (HCC): ICD-10-CM

## 2021-07-27 PROCEDURE — 90834 PSYTX W PT 45 MINUTES: CPT | Performed by: SOCIAL WORKER

## 2021-07-27 NOTE — PSYCH
Psychotherapy Provided: Individual Psychotherapy 50 minutes     Length of time in session: 50 minutes, follow up in 2week    Goals addressed in session: Goal 1     Pain:  1  none      Current suicide risk : Low      D:  Met with Dara for Individual session during which she spoke about her father's ICU hospitalization for COVID and her plans to go to [place] to visit her brother, possibly him next week as he recovers  Gabino Whiting verbalized her decision to allow a co-worker to stay with her, but how upset she is that this person has not paid any rent nor has Gabino Whiting felt "strong enough" to kick her out  A:  Gabino Whiting was againreceptive to this worker's feedback and support today but was less self confident as she believes that the [facility] community thinks "less" of her for quitting her position last year  This weighs very heavy on her and her ability to care for herself  P:  Upcoming sessions will be used to support her through biweekly sessions at this time  This note was not shared with the patient due to this is a psychotherapy note      [address]: Diagnosis and Treatment Plan explained to Raymond Lenz relates understanding diagnosis and is agreeable to Treatment Plan  No    Encounter Diagnoses   Name Primary?     Bipolar disorder, current episode mixed, severe, without psychotic features ([facility] [address] )     PTSD (post-traumatic stress disorder)

## 2022-02-18 ENCOUNTER — TELEPHONE (OUTPATIENT)
Dept: FAMILY MEDICINE CLINIC | Facility: CLINIC | Age: 22
End: 2022-02-18

## 2022-02-18 NOTE — TELEPHONE ENCOUNTER
Since she is not vaccinated the likelihood  is that she still will test positive even at day #8   Of course she could always try home kit

## 2022-02-18 NOTE — TELEPHONE ENCOUNTER
CHIKA patient called the office to get further guidance on covid testing  Patient is not vaccinated with covid  Her partner tested positive on Monday, 2/14 and pateint developed symptoms on 2/15  Patient is mainly experiencing GI symptoms, dry cough and fatigue  I gave patient the quarantine guidelines and testing recommendation  Patient cannot come until 2/23 to get tested  I offered [facility] and patient denied  I informed patient waiting until then is not recommended since she will would have already completed her quarantine/masking guidelines  Patient still insisted on coming 2/23, patient added to nurse schedule

## 2022-07-25 ENCOUNTER — APPOINTMENT (OUTPATIENT)
Dept: LAB | Facility: MEDICAL CENTER | Age: 22
End: 2022-07-25
Payer: COMMERCIAL

## 2022-07-25 ENCOUNTER — OFFICE VISIT (OUTPATIENT)
Dept: OBGYN CLINIC | Facility: MEDICAL CENTER | Age: 22
End: 2022-07-25
Payer: COMMERCIAL

## 2022-07-25 VITALS
BODY MASS INDEX: 37.35 KG/M2 | DIASTOLIC BLOOD PRESSURE: 70 MMHG | WEIGHT: 238 LBS | HEIGHT: 67 IN | SYSTOLIC BLOOD PRESSURE: 122 MMHG

## 2022-07-25 DIAGNOSIS — N93.9 ABNORMAL UTERINE BLEEDING (AUB): ICD-10-CM

## 2022-07-25 DIAGNOSIS — Z01.419 WELL WOMAN EXAM WITH ROUTINE GYNECOLOGICAL EXAM: Primary | ICD-10-CM

## 2022-07-25 DIAGNOSIS — Z11.3 ROUTINE SCREENING FOR STI (SEXUALLY TRANSMITTED INFECTION): ICD-10-CM

## 2022-07-25 PROBLEM — S86.912A STRAIN OF LEFT KNEE: Status: RESOLVED | Noted: 2021-06-10 | Resolved: 2022-07-25

## 2022-07-25 LAB
BASOPHILS # BLD AUTO: 0.04 THOUSANDS/ΜL (ref 0–0.1)
BASOPHILS NFR BLD AUTO: 0 % (ref 0–1)
EOSINOPHIL # BLD AUTO: 0.15 THOUSAND/ΜL (ref 0–0.61)
EOSINOPHIL NFR BLD AUTO: 1 % (ref 0–6)
ERYTHROCYTE [DISTWIDTH] IN BLOOD BY AUTOMATED COUNT: 12.4 % (ref 11.6–15.1)
FSH SERPL-ACNC: 1.3 MIU/ML
HCT VFR BLD AUTO: 44.1 % (ref 34.8–46.1)
HGB BLD-MCNC: 14.3 G/DL (ref 11.5–15.4)
IMM GRANULOCYTES # BLD AUTO: 0.05 THOUSAND/UL (ref 0–0.2)
IMM GRANULOCYTES NFR BLD AUTO: 0 % (ref 0–2)
LH SERPL-ACNC: 6.1 MIU/ML
LYMPHOCYTES # BLD AUTO: 3.31 THOUSANDS/ΜL (ref 0.6–4.47)
LYMPHOCYTES NFR BLD AUTO: 29 % (ref 14–44)
MCH RBC QN AUTO: 28.7 PG (ref 26.8–34.3)
MCHC RBC AUTO-ENTMCNC: 32.4 G/DL (ref 31.4–37.4)
MCV RBC AUTO: 89 FL (ref 82–98)
MONOCYTES # BLD AUTO: 1.16 THOUSAND/ΜL (ref 0.17–1.22)
MONOCYTES NFR BLD AUTO: 10 % (ref 4–12)
NEUTROPHILS # BLD AUTO: 6.92 THOUSANDS/ΜL (ref 1.85–7.62)
NEUTS SEG NFR BLD AUTO: 60 % (ref 43–75)
NRBC BLD AUTO-RTO: 0 /100 WBCS
PLATELET # BLD AUTO: 442 THOUSANDS/UL (ref 149–390)
PMV BLD AUTO: 10.6 FL (ref 8.9–12.7)
RBC # BLD AUTO: 4.98 MILLION/UL (ref 3.81–5.12)
TSH SERPL DL<=0.05 MIU/L-ACNC: 1.65 UIU/ML (ref 0.45–4.5)
WBC # BLD AUTO: 11.63 THOUSAND/UL (ref 4.31–10.16)

## 2022-07-25 PROCEDURE — 0503F POSTPARTUM CARE VISIT: CPT | Performed by: NURSE PRACTITIONER

## 2022-07-25 PROCEDURE — 36415 COLL VENOUS BLD VENIPUNCTURE: CPT

## 2022-07-25 PROCEDURE — 85025 COMPLETE CBC W/AUTO DIFF WBC: CPT

## 2022-07-25 PROCEDURE — 82627 DEHYDROEPIANDROSTERONE: CPT

## 2022-07-25 PROCEDURE — 87491 CHLMYD TRACH DNA AMP PROBE: CPT | Performed by: NURSE PRACTITIONER

## 2022-07-25 PROCEDURE — G0143 SCR C/V CYTO,THINLAYER,RESCR: HCPCS | Performed by: NURSE PRACTITIONER

## 2022-07-25 PROCEDURE — 83002 ASSAY OF GONADOTROPIN (LH): CPT

## 2022-07-25 PROCEDURE — 99385 PREV VISIT NEW AGE 18-39: CPT | Performed by: NURSE PRACTITIONER

## 2022-07-25 PROCEDURE — 87591 N.GONORRHOEAE DNA AMP PROB: CPT | Performed by: NURSE PRACTITIONER

## 2022-07-25 PROCEDURE — 83001 ASSAY OF GONADOTROPIN (FSH): CPT

## 2022-07-25 PROCEDURE — 84443 ASSAY THYROID STIM HORMONE: CPT

## 2022-07-25 NOTE — PROGRESS NOTES
Liseth Mcqueen is a 23 y o  female who presents for annual well woman exam      Periods are irregular, lasting 7 days  Menses irregular for 4-5 years  Heavy bleeding for 4-5 days  No intermenstrual bleeding, spotting, or discharge  Current contraception:  same sex partner  History of abnormal Pap smear: 1st Pap smear today  Family history of uterine or ovarian cancer: no  Regular self breast exam: no  History of abnormal mammogram: to begin at age 34 unless otherwise clinically indicated   Family history of breast cancer: no  History of abnormal lipids: no  Gardasil vaccine:  No      Menstrual History:  OB History        0    Para   0    Term   0       0    AB   0    Living   0       SAB   0    IAB   0    Ectopic   0    Multiple   0    Live Births   0                Menarche age: 13  Patient's last menstrual period was 2022 (approximate)  Period Cycle (Days):  (every 1 to 3 months)  Period Duration (Days): 7  Period Pattern: (!) Irregular  Menstrual Flow: Light, Moderate, Heavy (heavy for 4-5 days)  Menstrual Control Change Freq (Hours): every 1-2 hours  Dysmenorrhea: (!) Mild  Dysmenorrhea Symptoms: Headache    The following portions of the patient's history were reviewed and updated as appropriate: allergies, current medications, past family history, past medical history, past social history, past surgical history and problem list     Review of Systems  Pertinent items are noted in HPI        Objective      /70   Ht 5' 7" (1 702 m)   Wt 108 kg (238 lb)   LMP 2022 (Approximate)   BMI 37 28 kg/m²     General:   alert and oriented, in no acute distress, alert, morbidly obese, appears stated age and cooperative   Heart: regular rate and rhythm, S1, S2 normal, no murmur, click, rub or gallop   Lungs: clear to auscultation bilaterally   Abdomen: soft, non-tender, without masses or organomegaly   Vulva: normal, Bartholin's, Urethra, Cheval's normal   Vagina: normal mucosa, normal discharge, no palpable nodules   Cervix: no bleeding following Pap, no cervical motion tenderness and no lesions   Uterus: non-tender, Difficult to assess due to body habitus   Adnexa: no mass, fullness, tenderness and Difficult to assess due to body habitus   Breast: breasts appear normal, no suspicious masses, no skin or nipple changes or axillary nodes  Assessment      @well woman@   Plan      All questions answered  Await pap smear results  Blood tests: CBC with diff, Free T4 level, FSH, LH and TSH  Breast self exam technique reviewed and patient encouraged to perform self-exam monthly  Chlamydia specimen  Diagnosis explained in detail, including differential   Dietary diary  Discussed healthy lifestyle modifications  Educational material distributed  Follow up in 1 Year for annual exam   Follow up as needed  Pelvic ultrasound  Thin prep Pap smear  Breast awareness reviewed   AUB-encouraged to complete pelvic ultrasound and blood work  Reviewed treatment options including NSAIDs and hormonal     Will call with results once available  Encouraged healthy diet, exercise and lifestyle  Encouraged follow-up with PCP as needed  Gardasil vaccine series reviewed  Written information provided  Encouraged social distancing, good hand hygiene, avoidance of crowds and masking  Written information provided about COVID-19    Will call with results  VBI-    BMI Counseling: Body mass index is 37 28 kg/m²  The BMI is above normal  Nutrition recommendations include reducing portion sizes, decreasing overall calorie intake and 3-5 servings of fruits/vegetables daily  Exercise recommendations include exercising 3-5 times per week, joining a gym and strength training exercises  Tobacco Cessation Counseling: Tobacco cessation counseling was not provided   The patient is sincerely urged to quit consumption of tobacco  She is not ready to quit tobacco  The numerous health risks of tobacco consumption were discussed  Encouraged to quit vaping

## 2022-07-26 LAB
C TRACH DNA SPEC QL NAA+PROBE: NEGATIVE
N GONORRHOEA DNA SPEC QL NAA+PROBE: NEGATIVE

## 2022-07-27 LAB — DHEA-S SERPL-MCNC: 104 UG/DL (ref 110–431.7)

## 2022-08-03 LAB
LAB AP GYN PRIMARY INTERPRETATION: NORMAL
LAB AP LMP: NORMAL
Lab: NORMAL

## 2023-05-01 ENCOUNTER — OFFICE VISIT (OUTPATIENT)
Dept: FAMILY MEDICINE CLINIC | Facility: CLINIC | Age: 23
End: 2023-05-01

## 2023-05-01 VITALS
SYSTOLIC BLOOD PRESSURE: 124 MMHG | HEIGHT: 68 IN | BODY MASS INDEX: 38.19 KG/M2 | TEMPERATURE: 97.7 F | HEART RATE: 84 BPM | OXYGEN SATURATION: 98 % | DIASTOLIC BLOOD PRESSURE: 84 MMHG | RESPIRATION RATE: 16 BRPM | WEIGHT: 252 LBS

## 2023-05-01 DIAGNOSIS — E56.9 VITAMIN DEFICIENCY: ICD-10-CM

## 2023-05-01 DIAGNOSIS — Z76.89 ENCOUNTER TO ESTABLISH CARE: ICD-10-CM

## 2023-05-01 DIAGNOSIS — E04.9 ENLARGED THYROID: ICD-10-CM

## 2023-05-01 DIAGNOSIS — Z13.1 SCREENING FOR DIABETES MELLITUS: ICD-10-CM

## 2023-05-01 DIAGNOSIS — E55.9 VITAMIN D DEFICIENCY: ICD-10-CM

## 2023-05-01 DIAGNOSIS — Z13.220 SCREENING, LIPID: ICD-10-CM

## 2023-05-01 DIAGNOSIS — R19.7 DIARRHEA, UNSPECIFIED TYPE: ICD-10-CM

## 2023-05-01 DIAGNOSIS — Z13.0 SCREENING, ANEMIA, DEFICIENCY, IRON: ICD-10-CM

## 2023-05-01 DIAGNOSIS — Z11.4 SCREENING FOR HIV (HUMAN IMMUNODEFICIENCY VIRUS): ICD-10-CM

## 2023-05-01 DIAGNOSIS — Z11.59 NEED FOR HEPATITIS C SCREENING TEST: ICD-10-CM

## 2023-05-01 DIAGNOSIS — R53.83 OTHER FATIGUE: Primary | ICD-10-CM

## 2023-05-01 NOTE — ASSESSMENT & PLAN NOTE
Symptoms reviewed at length today  Uncertain cause  Differentials reviewed  Start work up today with lab work and stool as below  Referral to GI for evaluation  F/U here 3-4 weeks for preventative care and review testing  Further plan to be determined    F/U sooner with any change

## 2023-05-01 NOTE — ASSESSMENT & PLAN NOTE
Ongoing for 1-2 years  Referral today to GI  Advise keeping food/activity diary to assess for triggering/exacerbating factors

## 2023-05-01 NOTE — PROGRESS NOTES
[facility]    ASSESSMENT AND PLAN     1  Other fatigue  Assessment & Plan:  Symptoms reviewed at length today  Uncertain cause  Differentials reviewed  Start work up today with lab work and stool as below  Referral to GI for evaluation  F/U here 3-4 weeks for preventative care and review testing  Further plan to be determined  F/U sooner with any change    Orders:  -     CBC and differential; Future  -     TSH, 3rd generation with Free T4 reflex; Future  -     Iron Panel (Includes Ferritin, Iron Sat%, Iron, and TIBC); Future  -     Vitamin D 25 hydroxy; Future  -     Vitamin B12; Future    2  Diarrhea, unspecified type  Assessment & Plan:  Ongoing for 1-2 years  Referral today to GI  Advise keeping food/activity diary to assess for triggering/exacerbating factors  Orders:  -     CBC and differential; Future  -     Comprehensive metabolic panel; Future  -     Ova and parasite examination; Future  -     Stool Enteric Bacterial Panel by PCR; Future  -     Ambulatory Referral to Gastroenterology; Future    3  Screening for diabetes mellitus  -     Comprehensive metabolic panel; Future    4  Screening, anemia, deficiency, iron  -     CBC and differential; Future  -     Iron Panel (Includes Ferritin, Iron Sat%, Iron, and TIBC); Future    5  Screening, lipid  -     Lipid panel; Future    6  Vitamin D deficiency  -     Vitamin D 25 hydroxy; Future    7  Need for hepatitis C screening test  -     Hepatitis C antibody; Future    8  Screening for HIV (human immunodeficiency virus)  -     : HIV 1/2 AB/AG w Reflex [facility] for 2 yr old and above; Future    9  Enlarged thyroid  Assessment & Plan:  Palpated today - right lobe appears enlarged  Will check Us today    Orders:  -     US thyroid; Future; Expected date: 05/01/2023    10  Vitamin deficiency  -     Vitamin B12; Future    11   Encounter to establish care  Comments:  Establishing primary care in our office         SUBJECTIVE       Patient ID: Ju Ward Torres Snowden is a 23 y o  female  Chief Complaint   Patient presents with    Establish Care     Rash, HA, loose stools  HISTORY OF PRESENT ILLNESS    Presents to establish primary care  Has been having intermittent symptoms on/off over the last few years  Fatigue, mood swings (which she can partially correlate with her menses) gluten intolerance (notes increased headaches, mood, fatigue and energy when she eats - and symptoms last for several days after at times), has noted hive rash on arms if she drinks gluten alcohol,  lose diarrhea (describes as watery, light yellow, mucous occasionally - no blood) cramping and bloating lower abdomin (occurs randomly)  Did discuss with prior PCP and was referred to allergist  She attempted to scheduled but they never called her back  She admits she did not further persue  She has completed removed gluten from her diet in the past and has noted improvement  She has recently introduced gluten again, as she would like to be tested  Additionally notes a an enlarged lymph node, posterior left neck  Notes for last several weeks  No pain  No fevers  Works at [employer] center  -   The following portions of the patient's history were reviewed and updated as appropriate: allergies, current medications, past family history, past medical history, past social history, past surgical history, and problem list     REVIEW OF SYSTEMS  Review of Systems   Constitutional: Positive for fatigue  Negative for activity change, appetite change and fever  Respiratory: Negative  Cardiovascular: Negative  Gastrointestinal: Positive for abdominal distention, abdominal pain and diarrhea  Negative for blood in stool and nausea  Details as in HPI   Genitourinary: Negative  Allergic/Immunologic: Positive for environmental allergies  Negative for immunocompromised state  Food allergies: possible  Neurological: Negative  Psychiatric/Behavioral: Negative  "      OBJECTIVE      VITAL SIGNS  /84 (BP Location: Right arm, Patient Position: Sitting, Cuff Size: Large)   Pulse 84   Temp 97 7 °F (36 5 °C) (Temporal)   Resp 16   Ht 5' 8\" (1 727 m)   Wt 114 kg (252 lb)   LMP 04/27/2023   SpO2 98%   BMI 38 32 kg/m²     CURRENT MEDICATIONS  No current outpatient medications on file  PHYSICAL EXAMINATION   Physical Exam  Vitals and nursing note reviewed  Constitutional:       General: She is not in acute distress  Appearance: Normal appearance  She is not ill-appearing  HENT:      Head: Normocephalic  Neck:      Thyroid: Thyromegaly present  Comments: Pt reports palpable occipital nde (left) on/off last few weeks  Not palpated today  Checking thyroid US - check occipital region as well  Cardiovascular:      Rate and Rhythm: Normal rate and regular rhythm  Heart sounds: Normal heart sounds  Pulmonary:      Effort: Pulmonary effort is normal  No respiratory distress  Breath sounds: Normal breath sounds and air entry  Musculoskeletal:      Comments: Note keratosis pilaris b/l upper arms  Will check vitamins   Lymphadenopathy:      Cervical: No cervical adenopathy  Neurological:      Mental Status: She is alert and oriented to person, place, and time  Psychiatric:         Attention and Perception: Attention normal          Mood and Affect: Mood normal          Behavior: Behavior normal          Thought Content:  Thought content normal          Judgment: Judgment normal          "

## 2023-06-06 ENCOUNTER — APPOINTMENT (OUTPATIENT)
Dept: LAB | Facility: CLINIC | Age: 23
End: 2023-06-06
Payer: COMMERCIAL

## 2023-06-06 ENCOUNTER — LAB (OUTPATIENT)
Dept: LAB | Facility: CLINIC | Age: 23
End: 2023-06-06
Payer: COMMERCIAL

## 2023-06-06 DIAGNOSIS — E55.9 VITAMIN D DEFICIENCY: ICD-10-CM

## 2023-06-06 DIAGNOSIS — R53.83 OTHER FATIGUE: ICD-10-CM

## 2023-06-06 DIAGNOSIS — Z11.59 NEED FOR HEPATITIS C SCREENING TEST: ICD-10-CM

## 2023-06-06 DIAGNOSIS — Z11.4 SCREENING FOR HIV (HUMAN IMMUNODEFICIENCY VIRUS): ICD-10-CM

## 2023-06-06 DIAGNOSIS — Z13.1 SCREENING FOR DIABETES MELLITUS: ICD-10-CM

## 2023-06-06 DIAGNOSIS — Z13.0 SCREENING, ANEMIA, DEFICIENCY, IRON: ICD-10-CM

## 2023-06-06 DIAGNOSIS — R19.7 DIARRHEA, UNSPECIFIED TYPE: ICD-10-CM

## 2023-06-06 DIAGNOSIS — Z13.220 SCREENING, LIPID: ICD-10-CM

## 2023-06-06 DIAGNOSIS — E56.9 VITAMIN DEFICIENCY: ICD-10-CM

## 2023-06-06 LAB
25(OH)D3 SERPL-MCNC: 18.9 NG/ML (ref 30–100)
ALBUMIN SERPL BCP-MCNC: 3.6 G/DL (ref 3.5–5)
ALP SERPL-CCNC: 33 U/L (ref 46–116)
ALT SERPL W P-5'-P-CCNC: 63 U/L (ref 12–78)
ANION GAP SERPL CALCULATED.3IONS-SCNC: 5 MMOL/L (ref 4–13)
AST SERPL W P-5'-P-CCNC: 33 U/L (ref 5–45)
BASOPHILS # BLD AUTO: 0.04 THOUSANDS/ÂΜL (ref 0–0.1)
BASOPHILS NFR BLD AUTO: 1 % (ref 0–1)
BILIRUB SERPL-MCNC: 0.41 MG/DL (ref 0.2–1)
BUN SERPL-MCNC: 9 MG/DL (ref 5–25)
CALCIUM SERPL-MCNC: 9.2 MG/DL (ref 8.3–10.1)
CHLORIDE SERPL-SCNC: 108 MMOL/L (ref 96–108)
CHOLEST SERPL-MCNC: 165 MG/DL
CO2 SERPL-SCNC: 25 MMOL/L (ref 21–32)
CREAT SERPL-MCNC: 0.77 MG/DL (ref 0.6–1.3)
EOSINOPHIL # BLD AUTO: 0.22 THOUSAND/ÂΜL (ref 0–0.61)
EOSINOPHIL NFR BLD AUTO: 3 % (ref 0–6)
ERYTHROCYTE [DISTWIDTH] IN BLOOD BY AUTOMATED COUNT: 12.5 % (ref 11.6–15.1)
FERRITIN SERPL-MCNC: 53 NG/ML (ref 11–307)
GFR SERPL CREATININE-BSD FRML MDRD: 108 ML/MIN/1.73SQ M
GLUCOSE P FAST SERPL-MCNC: 94 MG/DL (ref 65–99)
HCT VFR BLD AUTO: 42.5 % (ref 34.8–46.1)
HCV AB SER QL: NORMAL
HDLC SERPL-MCNC: 47 MG/DL
HGB BLD-MCNC: 13.5 G/DL (ref 11.5–15.4)
HIV 1+2 AB+HIV1 P24 AG SERPL QL IA: NORMAL
HIV 2 AB SERPL QL IA: NORMAL
HIV1 AB SERPL QL IA: NORMAL
HIV1 P24 AG SERPL QL IA: NORMAL
IMM GRANULOCYTES # BLD AUTO: 0.04 THOUSAND/UL (ref 0–0.2)
IMM GRANULOCYTES NFR BLD AUTO: 1 % (ref 0–2)
IRON SATN MFR SERPL: 20 % (ref 15–50)
IRON SERPL-MCNC: 75 UG/DL (ref 50–170)
LDLC SERPL CALC-MCNC: 94 MG/DL (ref 0–100)
LYMPHOCYTES # BLD AUTO: 2.72 THOUSANDS/ÂΜL (ref 0.6–4.47)
LYMPHOCYTES NFR BLD AUTO: 31 % (ref 14–44)
MCH RBC QN AUTO: 28.5 PG (ref 26.8–34.3)
MCHC RBC AUTO-ENTMCNC: 31.8 G/DL (ref 31.4–37.4)
MCV RBC AUTO: 90 FL (ref 82–98)
MONOCYTES # BLD AUTO: 0.64 THOUSAND/ÂΜL (ref 0.17–1.22)
MONOCYTES NFR BLD AUTO: 7 % (ref 4–12)
NEUTROPHILS # BLD AUTO: 5 THOUSANDS/ÂΜL (ref 1.85–7.62)
NEUTS SEG NFR BLD AUTO: 57 % (ref 43–75)
NONHDLC SERPL-MCNC: 118 MG/DL
NRBC BLD AUTO-RTO: 0 /100 WBCS
PLATELET # BLD AUTO: 367 THOUSANDS/UL (ref 149–390)
PMV BLD AUTO: 10.4 FL (ref 8.9–12.7)
POTASSIUM SERPL-SCNC: 4.1 MMOL/L (ref 3.5–5.3)
PROT SERPL-MCNC: 7.7 G/DL (ref 6.4–8.4)
RBC # BLD AUTO: 4.74 MILLION/UL (ref 3.81–5.12)
SODIUM SERPL-SCNC: 138 MMOL/L (ref 135–147)
TIBC SERPL-MCNC: 372 UG/DL (ref 250–450)
TRIGL SERPL-MCNC: 121 MG/DL
TSH SERPL DL<=0.05 MIU/L-ACNC: 1.83 UIU/ML (ref 0.45–4.5)
VIT B12 SERPL-MCNC: 424 PG/ML (ref 180–914)
WBC # BLD AUTO: 8.66 THOUSAND/UL (ref 4.31–10.16)

## 2023-06-06 PROCEDURE — 82728 ASSAY OF FERRITIN: CPT

## 2023-06-06 PROCEDURE — 87505 NFCT AGENT DETECTION GI: CPT

## 2023-06-06 PROCEDURE — 82607 VITAMIN B-12: CPT

## 2023-06-06 PROCEDURE — 80053 COMPREHEN METABOLIC PANEL: CPT

## 2023-06-06 PROCEDURE — 80061 LIPID PANEL: CPT

## 2023-06-06 PROCEDURE — 84443 ASSAY THYROID STIM HORMONE: CPT

## 2023-06-06 PROCEDURE — 87389 HIV-1 AG W/HIV-1&-2 AB AG IA: CPT

## 2023-06-06 PROCEDURE — 82306 VITAMIN D 25 HYDROXY: CPT

## 2023-06-06 PROCEDURE — 87177 OVA AND PARASITES SMEARS: CPT

## 2023-06-06 PROCEDURE — 85025 COMPLETE CBC W/AUTO DIFF WBC: CPT

## 2023-06-06 PROCEDURE — 87209 SMEAR COMPLEX STAIN: CPT

## 2023-06-06 PROCEDURE — 83550 IRON BINDING TEST: CPT

## 2023-06-06 PROCEDURE — 83540 ASSAY OF IRON: CPT

## 2023-06-06 PROCEDURE — 86803 HEPATITIS C AB TEST: CPT

## 2023-06-06 PROCEDURE — 36415 COLL VENOUS BLD VENIPUNCTURE: CPT

## 2023-06-07 LAB
CAMPYLOBACTER DNA SPEC NAA+PROBE: NORMAL
SALMONELLA DNA SPEC QL NAA+PROBE: NORMAL
SHIGA TOXIN STX GENE SPEC NAA+PROBE: NORMAL
SHIGELLA DNA SPEC QL NAA+PROBE: NORMAL

## 2023-12-04 ENCOUNTER — TELEPHONE (OUTPATIENT)
Dept: FAMILY MEDICINE CLINIC | Facility: CLINIC | Age: 23
End: 2023-12-04

## 2023-12-04 NOTE — TELEPHONE ENCOUNTER
Pt stopped into the office day asking for an update on the letter. I advised pt that there was no answer to her message yet. She asked that if it is at all possible to get the exemption that we can send it to her via [email or web address].

## 2024-02-12 DIAGNOSIS — Z00.6 ENCOUNTER FOR EXAMINATION FOR NORMAL COMPARISON OR CONTROL IN CLINICAL RESEARCH PROGRAM: ICD-10-CM

## 2024-02-21 ENCOUNTER — APPOINTMENT (OUTPATIENT)
Dept: LAB | Facility: CLINIC | Age: 24
End: 2024-02-21

## 2024-02-21 DIAGNOSIS — Z00.6 ENCOUNTER FOR EXAMINATION FOR NORMAL COMPARISON OR CONTROL IN CLINICAL RESEARCH PROGRAM: ICD-10-CM

## 2024-02-21 PROCEDURE — 36415 COLL VENOUS BLD VENIPUNCTURE: CPT

## 2024-03-10 LAB
APOB+LDLR+PCSK9 GENE MUT ANL BLD/T: NOT DETECTED
BRCA1+BRCA2 DEL+DUP + FULL MUT ANL BLD/T: NOT DETECTED
MLH1+MSH2+MSH6+PMS2 GN DEL+DUP+FUL M: NOT DETECTED

## 2024-04-12 ENCOUNTER — LAB (OUTPATIENT)
Dept: LAB | Facility: MEDICAL CENTER | Age: 24
End: 2024-04-12
Payer: COMMERCIAL

## 2024-04-12 ENCOUNTER — AMB VIDEO VISIT (OUTPATIENT)
Dept: OTHER | Facility: HOSPITAL | Age: 24
End: 2024-04-12

## 2024-04-12 VITALS — BODY MASS INDEX: 32.69 KG/M2 | HEART RATE: 60 BPM | RESPIRATION RATE: 16 BRPM | WEIGHT: 215 LBS

## 2024-04-12 DIAGNOSIS — R10.13 EPIGASTRIC PAIN: Primary | ICD-10-CM

## 2024-04-12 DIAGNOSIS — R10.13 EPIGASTRIC PAIN: ICD-10-CM

## 2024-04-12 LAB
BASOPHILS # BLD AUTO: 0.03 THOUSANDS/ÂΜL (ref 0–0.1)
BASOPHILS NFR BLD AUTO: 0 % (ref 0–1)
EOSINOPHIL # BLD AUTO: 0.1 THOUSAND/ÂΜL (ref 0–0.61)
EOSINOPHIL NFR BLD AUTO: 1 % (ref 0–6)
ERYTHROCYTE [DISTWIDTH] IN BLOOD BY AUTOMATED COUNT: 12 % (ref 11.6–15.1)
HCT VFR BLD AUTO: 42 % (ref 34.8–46.1)
HGB BLD-MCNC: 13.9 G/DL (ref 11.5–15.4)
IMM GRANULOCYTES # BLD AUTO: 0.02 THOUSAND/UL (ref 0–0.2)
IMM GRANULOCYTES NFR BLD AUTO: 0 % (ref 0–2)
LYMPHOCYTES # BLD AUTO: 3.12 THOUSANDS/ÂΜL (ref 0.6–4.47)
LYMPHOCYTES NFR BLD AUTO: 37 % (ref 14–44)
MCH RBC QN AUTO: 29.2 PG (ref 26.8–34.3)
MCHC RBC AUTO-ENTMCNC: 33.1 G/DL (ref 31.4–37.4)
MCV RBC AUTO: 88 FL (ref 82–98)
MONOCYTES # BLD AUTO: 0.67 THOUSAND/ÂΜL (ref 0.17–1.22)
MONOCYTES NFR BLD AUTO: 8 % (ref 4–12)
NEUTROPHILS # BLD AUTO: 4.46 THOUSANDS/ÂΜL (ref 1.85–7.62)
NEUTS SEG NFR BLD AUTO: 54 % (ref 43–75)
NRBC BLD AUTO-RTO: 0 /100 WBCS
PLATELET # BLD AUTO: 368 THOUSANDS/UL (ref 149–390)
PMV BLD AUTO: 11.1 FL (ref 8.9–12.7)
RBC # BLD AUTO: 4.76 MILLION/UL (ref 3.81–5.12)
WBC # BLD AUTO: 8.4 THOUSAND/UL (ref 4.31–10.16)

## 2024-04-12 PROCEDURE — 36415 COLL VENOUS BLD VENIPUNCTURE: CPT

## 2024-04-12 PROCEDURE — 85025 COMPLETE CBC W/AUTO DIFF WBC: CPT

## 2024-04-12 PROCEDURE — 80053 COMPREHEN METABOLIC PANEL: CPT

## 2024-04-12 PROCEDURE — 83690 ASSAY OF LIPASE: CPT

## 2024-04-12 RX ORDER — OMEPRAZOLE 20 MG/1
20 CAPSULE, DELAYED RELEASE ORAL DAILY
Qty: 30 CAPSULE | Refills: 0 | Status: SHIPPED | OUTPATIENT
Start: 2024-04-12

## 2024-04-12 NOTE — PROGRESS NOTES
Required Documentation:  Encounter provider Shannon D Severino, PA-C    Provider located at [facility]  VIRTUAL CARE   [address] [id number]    Identify all parties in room with patient during virtual visit:  No one else    The patient was identified by name and date of birth. Carolynn Haines was informed that this is a telemedicine visit and that the visit is being conducted through the Care Anywhere [employer] platform. She agrees to proceed..  My office door was closed. No one else was in the room.  She acknowledged consent and understanding of privacy and security of the video platform. The patient has agreed to participate and understands they can discontinue the visit at any time.    Verification of patient location:    Patient is located at Home in the following state in which I hold an active license [place]    Patient is aware this is a billable service.     Reason for visit is No chief complaint on file.       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 Currently 3-4/10. Has been having waves of pain until 0730 with slight nausea. Decreased appetite, but forced herself to eat which resolved pain. She was able to fall asleep, but the pain woke her up at 6-7/10. Took a tylenol and ibuprofen last night for HA. She vapes nicotine, she is under a lot of stress, NSAID overuse in the past (but has been consciously trying to decrease use over past 2 years), and spicy foods. Denies often heartburn or reflux. No hematochezia, melena, vomiting. No ETOH. No fevers. Nothing tried for the pain. No hx pancreatitis.    Past Medical History:   Diagnosis Date    Dehydration     Last assessed: 1/14/2015    Vesicoureteral reflux        Past Surgical History:   Procedure Laterality Date    WISDOM TOOTH EXTRACTION N/A         Allergies   Allergen Reactions    Bee Venom         Review of Systems   Constitutional:  Negative for fever.   HENT:  Negative for nosebleeds.    Eyes:  Negative for redness.   Respiratory:  Negative for shortness of breath.    Cardiovascular:  Negative for chest pain.   Gastrointestinal:  Positive for abdominal pain and nausea. Negative for blood in stool and vomiting.   Genitourinary:  Negative for hematuria.   Musculoskeletal:  Negative for gait problem.   Skin:  Negative for rash.   Neurological:  Negative for seizures.   Psychiatric/Behavioral:  Negative for behavioral problems.        Video Exam    Vitals:    04/12/24 1417   Pulse: 60   Resp: 16   Weight: 97.5 kg (215 lb)       Physical Exam  Constitutional:       General: She is not in acute distress.     Appearance: Normal appearance. She is not toxic-appearing.   HENT:      Head: Normocephalic and atraumatic.      Nose: No rhinorrhea.      Mouth/Throat:      Mouth: Mucous membranes are moist.   Eyes:      Conjunctiva/sclera: Conjunctivae normal.   Pulmonary:      Effort: Pulmonary effort is normal. No respiratory distress.      Breath sounds: No wheezing (no gross audible wheeze through computer).   Abdominal:      Tenderness: There is abdominal tenderness in the epigastric area. Negative signs include Yen's sign.   Musculoskeletal:      Cervical back: Normal range of motion.   Skin:     Findings: No rash (on face or neck).   Neurological:      Mental Status: She is alert.      Cranial Nerves: No dysarthria or facial asymmetry.   Psychiatric:         Mood and Affect: Mood normal.         Behavior: Behavior normal.         Visit Time  Total Visit Duration: 17 minutes    Assessment/Plan:    Diagnoses and all orders for this visit:    Epigastric pain  -     Ambulatory Referral to Gastroenterology; Future  -     CBC and differential; Future  -     Comprehensive metabolic panel; Future  -     Lipase; Future  -     omeprazole (PriLOSEC) 20 mg delayed release capsule; Take 1 capsule (20 mg total) by mouth  "daily        Patient Instructions   Schedule a follow-up appointment with your primary care physician for recheck in 2-3 days. If you cannot see your PCP, you can schedule a follow up appointment at a [facility] Now. Go to the emergency department if you develop any new or worsening symptoms including fever, worsening abdominal pain, blood in stool/vomit, or anything else that is concerning.    Excuses can be found in \"Letters\" section of [employer] kim. Can print if opened from a web browser  [facility] phone number is [phone] if you need assistance or have further questions    [phone] ([phone]  Schedule or Reschedule Outpatient Testing - Option 2  Billing - Option 3  General Info - Option 4  [employer] Help - Option 5  Comprehensive Spine Program - Option 6   COVID - Option 7    You can go to any outpatient [facility] Lab to get the blood work drawn  Just provide your name and date of birth at registration and they will be able to pull up the orders.  You can search for a lab using [employer] \"Find Care\" and filter by \"Labs\" or click the link below:  [email or web address]    Call [phone], option 2 to request a mobile lab visit to your home    The results will go directly to your [employer] kim under \"Test Results\"  You should be able to screenshot the results, or you can print if opened from a web browser  "

## 2024-04-12 NOTE — LETTER
April 12, 2024     Patient: Carolynn Haines   YOB: 2000   Date of Visit: 4/12/2024       To Whom it May Concern:    Carolynn Haines is under my professional care. She was seen virtually on 4/12/2024. She may return to work on 4/14/24 or sooner if feeling well enough .    If you have any questions or concerns, please don't hesitate to call.         Sincerely,          Shannon D Severino, PA-C        CC: No Recipients

## 2024-04-12 NOTE — PATIENT INSTRUCTIONS
"Schedule a follow-up appointment with your primary care physician for recheck in 2-3 days. If you cannot see your PCP, you can schedule a follow up appointment at a [facility] Now. Go to the emergency department if you develop any new or worsening symptoms including fever, worsening abdominal pain, blood in stool/vomit, or anything else that is concerning.    Excuses can be found in \"Letters\" section of [employer] kim. Can print if opened from a web browser  [facility] phone number is [phone] if you need assistance or have further questions    [phone]  Schedule or Reschedule Outpatient Testing - Option 2  Billing - Option 3  General Info - Option 4  [employer] Help - Option 5  Comprehensive Spine Program - Option 6   COVID - Option 7    You can go to any outpatient [facility] Lab to get the blood work drawn  Just provide your name and date of birth at registration and they will be able to pull up the orders.  You can search for a lab using [employer] \"Find Care\" and filter by \"Labs\" or click the link below:  [email or web address]    Call [phone], option 2 to request a mobile lab visit to your home    The results will go directly to your [employer] kim under \"Test Results\"  You should be able to screenshot the results, or you can print if opened from a web browser  "

## 2024-04-12 NOTE — CARE ANYWHERE EVISITS
Visit Summary for Carolynn Haines - Gender: Female - Date of Birth: 2000  Date: [id number] - Duration: 18 minutes  Patient: Carolynn Haines  Provider: Shannon Severino PA-C    Patient Contact Information  Address  [address] [place]; [place] [address]  [phone]    Visit Topics  Significant epi gastric pressure, gets better after eating. Started this morning at 4am at work. Concerned for duodenal ulcer. Hx of risk factors for ulcers. (+) pain upon palpation of upper abd, nausea, loss of appetite, and spontaneous episodes of   flushing/lightheadedness (-) vomiting, diarrhea, blood stool  [Added By: Self - 2024-04-12]    Triage Questions   What is your current physical address in the event of a medical emergency? Answer []  Are you allergic to any medications? Answer []  Are you now or could you be pregnant? Answer []  Do you have any immune system compromise or chronic lung   disease? Answer []  Do you have any vulnerable family members in the home (infant, pregnant, cancer, elderly)? Answer []     Conversation Transcripts  [0A][0A] [Notification] You are connected with Shannon Severino PA-C, Urgent Care Specialist.[0A][Notification] Carolynn Haines is located in [place].[0A][Notification] Carolynn Haines has shared health history...[0A]    Diagnosis  Epigastric pain    Procedures  Value: [id number] Code: CPT-4 UNLISTED E&M SERVICE    Medications Prescribed    No prescriptions ordered    Electronically signed by: Severino PA-C, Shannon(NPI [id number])

## 2024-04-13 LAB
ALBUMIN SERPL BCP-MCNC: 4.2 G/DL (ref 3.5–5)
ALP SERPL-CCNC: 31 U/L (ref 34–104)
ALT SERPL W P-5'-P-CCNC: 26 U/L (ref 7–52)
ANION GAP SERPL CALCULATED.3IONS-SCNC: 11 MMOL/L (ref 4–13)
AST SERPL W P-5'-P-CCNC: 23 U/L (ref 13–39)
BILIRUB SERPL-MCNC: 0.33 MG/DL (ref 0.2–1)
BUN SERPL-MCNC: 14 MG/DL (ref 5–25)
CALCIUM SERPL-MCNC: 9.4 MG/DL (ref 8.4–10.2)
CHLORIDE SERPL-SCNC: 103 MMOL/L (ref 96–108)
CO2 SERPL-SCNC: 24 MMOL/L (ref 21–32)
CREAT SERPL-MCNC: 0.65 MG/DL (ref 0.6–1.3)
GFR SERPL CREATININE-BSD FRML MDRD: 123 ML/MIN/1.73SQ M
GLUCOSE SERPL-MCNC: 68 MG/DL (ref 65–140)
LIPASE SERPL-CCNC: 33 U/L (ref 11–82)
POTASSIUM SERPL-SCNC: 3.9 MMOL/L (ref 3.5–5.3)
PROT SERPL-MCNC: 7.2 G/DL (ref 6.4–8.4)
SODIUM SERPL-SCNC: 138 MMOL/L (ref 135–147)

## 2024-04-18 ENCOUNTER — OFFICE VISIT (OUTPATIENT)
Dept: GASTROENTEROLOGY | Facility: CLINIC | Age: 24
End: 2024-04-18
Payer: COMMERCIAL

## 2024-04-18 ENCOUNTER — ANESTHESIA EVENT (OUTPATIENT)
Dept: ANESTHESIOLOGY | Facility: HOSPITAL | Age: 24
End: 2024-04-18

## 2024-04-18 ENCOUNTER — ANESTHESIA (OUTPATIENT)
Dept: ANESTHESIOLOGY | Facility: HOSPITAL | Age: 24
End: 2024-04-18

## 2024-04-18 VITALS
DIASTOLIC BLOOD PRESSURE: 70 MMHG | HEART RATE: 72 BPM | SYSTOLIC BLOOD PRESSURE: 107 MMHG | HEIGHT: 68 IN | OXYGEN SATURATION: 99 % | WEIGHT: 223.6 LBS | TEMPERATURE: 96.8 F | BODY MASS INDEX: 33.89 KG/M2

## 2024-04-18 DIAGNOSIS — R10.13 EPIGASTRIC PAIN: ICD-10-CM

## 2024-04-18 DIAGNOSIS — R19.7 DIARRHEA, UNSPECIFIED TYPE: ICD-10-CM

## 2024-04-18 DIAGNOSIS — R11.0 NAUSEA IN ADULT: ICD-10-CM

## 2024-04-18 DIAGNOSIS — R14.0 ABDOMINAL BLOATING: Primary | ICD-10-CM

## 2024-04-18 PROCEDURE — 99204 OFFICE O/P NEW MOD 45 MIN: CPT | Performed by: PHYSICIAN ASSISTANT

## 2024-04-18 NOTE — PROGRESS NOTES
[facility] - Outpatient Consultation  Carolynn Haines 23 y.o. female MRN: [id number]  Encounter: [id number]    Assessment and Plan    1. Epigastric pain  2. Nausea  3. Abdominal bloating  4. Loose stools  Last Friday the patient had acute onset epigastric pain that is severe and sharp in nature with a pressure sensation associated with nausea.  Initially had a week of constipation when this started but this is now resolved.  At baseline she has abdominal bloating and looser stools although this is improved recently with cutting out carbs in her diet.  She notices that gluten makes her bloating and loose stools worse. Pepto and omeprazole did not help symptoms.  -Obtain H pylori and celiac serologies   -RUQ U/S to r/o cholelithiasis  -EGD for further evaluation of gastritis, ulcers, etc    Follow up after EGD     ______________________________________________________________________    History of Present Illness  Carolynn Haines is a 23 y.o. female here for consultation of epigastric pain. This started acutely last Friday and has been intermittent.  The patient states that the pain is severe and feels like pressure and is sharp.  S uses NSAIDs but he is not certain what causes the pain, does not seem to be caused by eating.  Was associated with some nausea but no other GI symptoms although around the time of symptoms starting she was constipated which is atypical for her. Her constipation has now resolved.  At baseline she does have abdominal bloating and some looser stools.  She notices that gluten makes her feel worse.  Her bloating and loose stools have improved recently with dietary changes of cutting out carbs.  Does take NSAIDs as needed.  Tried Pepto and omeprazole without relief.      Review of Systems   Constitutional:  Negative for activity change, appetite change, chills, fatigue, fever and unexpected weight change.   Gastrointestinal:  Positive for abdominal pain,  constipation and nausea. Negative for abdominal distention, anal bleeding, blood in stool, diarrhea, rectal pain and vomiting.   Genitourinary:  Negative for dysuria, frequency and hematuria.   Musculoskeletal:  Negative for arthralgias and myalgias.   Neurological:  Negative for headaches.         Past Medical History  Past Medical History:   Diagnosis Date    Dehydration     Last assessed: 1/14/2015    Vesicoureteral reflux        Past Social history  Past Surgical History:   Procedure Laterality Date    WISDOM TOOTH EXTRACTION N/A      Social History     Socioeconomic History    Marital status: Single     Spouse name: Not on file    Number of children: Not on file    Years of education: Not on file    Highest education level: Not on file   Occupational History    Not on file   Tobacco Use    Smoking status: Every Day     Types: E-Cigarettes    Smokeless tobacco: Never    Tobacco comments:     vape everyday   Vaping Use    Vaping status: Every Day    Substances: Nicotine   Substance and Sexual Activity    Alcohol use: Not Currently     Comment: social    Drug use: No    Sexual activity: Not Currently     Partners: Female     Birth control/protection: None   Other Topics Concern    Not on file   Social History Narrative    Per [employer]-other parent-child estrangement     Social Determinants of Health     Financial Resource Strain: Not on file   Food Insecurity: Not on file   Transportation Needs: Not on file   Physical Activity: Not on file   Stress: Not on file   Social Connections: Not on file   Intimate Partner Violence: Not on file   Housing Stability: Not on file     Social History     Substance and Sexual Activity   Alcohol Use Not Currently    Comment: social     Social History     Substance and Sexual Activity   Drug Use No     Social History     Tobacco Use   Smoking Status Every Day    Types: E-Cigarettes   Smokeless Tobacco Never   Tobacco Comments    vape everyday       Past Family History  Family  "History   Problem Relation Age of Onset    Hypertension Mother     Diabetes Mother     Hyperlipidemia Mother     Hypothyroidism Mother     Kidney disease Mother     Mental illness Mother     Skin cancer Father     No Known Problems Brother     Hypertension Maternal Grandmother     Hyperlipidemia Maternal Grandmother     Diabetes Maternal Grandmother     Hypertension Maternal Grandfather     Hyperlipidemia Maternal Grandfather     Diabetes Maternal Grandfather     Schizophrenia Paternal Grandmother     COPD Paternal Grandmother     No Known Problems Paternal Grandfather     Breast cancer Neg Hx     Colon cancer Neg Hx     Ovarian cancer Neg Hx        Current Medications  Current Outpatient Medications   Medication Sig Dispense Refill    cholecalciferol 1,000 units tablet Take 5,000 Units by mouth daily      MAGNESIUM PO Take 165 mg by mouth in the morning      omeprazole (PriLOSEC) 20 mg delayed release capsule Take 1 capsule (20 mg total) by mouth daily (Patient not taking: Reported on 4/18/2024) 30 capsule 0     No current facility-administered medications for this visit.       Allergies  Allergies   Allergen Reactions    Bee Venom          The following portions of the patient's history were reviewed and updated as appropriate: allergies, current medications, past medical history, past social history, past surgical history and problem list.      Vitals  Vitals:    04/18/24 0749   BP: 107/70   BP Location: Right arm   Patient Position: Sitting   Cuff Size: Large   Pulse: 72   Temp: (!) 96.8 °F (36 °C)   TempSrc: Tympanic   SpO2: 99%   Weight: 101 kg (223 lb 9.6 oz)   Height: 5' 8\" (1.727 m)         Physical Exam  Constitutional   General appearance: Patient is seated and in no acute distress, well appearing and well nourished.   Head and Face   Head and face: Normal.    Eyes   Conjunctiva and lids: No erythema, swelling or discharge.  Anicteric.  Ears, Nose, Mouth, and Throat   Hearing: Normal.    Neck: Supple, " trachea midline.  Pulmonary   Respiratory effort: No increased work of breathing or signs of respiratory distress.    Cardiovascular   Examination of extremities for edema and/or varicosities: Normal.    Musculoskeletal   Gait and station: Normal   Skin   Skin and subcutaneous tissue: Warm, dry, and intact. No visible jaundice, lesions or rashes.  Psychiatric   Judgment and insight: Normal  Recent and remote memory:  Normal  Mood and affect: Normal      Results  No visits with results within 1 Day(s) from this visit.   Latest known visit with results is:   Lab on 04/12/2024   Component Date Value    WBC 04/12/2024 8.40     RBC 04/12/2024 4.76     Hemoglobin 04/12/2024 13.9     Hematocrit 04/12/2024 42.0     MCV 04/12/2024 88     MCH 04/12/2024 29.2     MCHC 04/12/2024 33.1     RDW 04/12/2024 12.0     MPV 04/12/2024 11.1     Platelets 04/12/2024 368     nRBC 04/12/2024 0     Segmented % 04/12/2024 54     Immature Grans % 04/12/2024 0     Lymphocytes % 04/12/2024 37     Monocytes % 04/12/2024 8     Eosinophils Relative 04/12/2024 1     Basophils Relative 04/12/2024 0     Absolute Neutrophils 04/12/2024 4.46     Absolute Immature Grans 04/12/2024 0.02     Absolute Lymphocytes 04/12/2024 3.12     Absolute Monocytes 04/12/2024 0.67     Eosinophils Absolute 04/12/2024 0.10     Basophils Absolute 04/12/2024 0.03     Sodium 04/12/2024 138     Potassium 04/12/2024 3.9     Chloride 04/12/2024 103     CO2 04/12/2024 24     ANION GAP 04/12/2024 11     BUN 04/12/2024 14     Creatinine 04/12/2024 0.65     Glucose 04/12/2024 68     Calcium 04/12/2024 9.4     AST 04/12/2024 23     ALT 04/12/2024 26     Alkaline Phosphatase 04/12/2024 31 (L)     Total Protein 04/12/2024 7.2     Albumin 04/12/2024 4.2     Total Bilirubin 04/12/2024 0.33     eGFR 04/12/2024 123     Lipase 04/12/2024 33        Radiology Results  No results found.    Orders  No orders of the defined types were placed in this encounter.      Answers submitted by the  patient for this visit:  Abdominal Pain Questionnaire (Submitted on 4/12/2024)  Chief Complaint: Abdominal pain  Chronicity: new  Onset: today  Onset quality: sudden  Frequency: 2 to 4 times per day  Episode duration: 4 Hours  Progression since onset: waxing and waning  Pain location: epigastric region  Pain - numeric: 6/10  Pain quality: burning, sharp  Radiates to: does not radiate  anorexia: Yes  belching: No  flatus: Yes  hematochezia: No  melena: No  weight loss: No  Aggravated by: eating  Relieved by: eating

## 2024-04-27 RX ORDER — SODIUM CHLORIDE 9 MG/ML
125 INJECTION, SOLUTION INTRAVENOUS CONTINUOUS
OUTPATIENT
Start: 2024-04-27

## 2024-04-29 ENCOUNTER — ANESTHESIA EVENT (OUTPATIENT)
Dept: ANESTHESIOLOGY | Facility: HOSPITAL | Age: 24
End: 2024-04-29

## 2024-04-29 ENCOUNTER — ANESTHESIA (OUTPATIENT)
Dept: ANESTHESIOLOGY | Facility: HOSPITAL | Age: 24
End: 2024-04-29

## 2024-04-29 PROBLEM — E66.9 OBESITY (BMI 30-39.9): Status: ACTIVE | Noted: 2022-07-25

## 2024-04-29 NOTE — ANESTHESIA PREPROCEDURE EVALUATION
Procedure:  PRE-OP ONLY    Relevant Problems   ANESTHESIA (within normal limits)      NEURO/PSYCH   (+) PTSD (post-traumatic stress disorder)   (+) Social anxiety disorder      Endocrine   (+) Enlarged thyroid      Other   (+) Diarrhea   (+) Obesity (BMI 30-39.9)   (+) Other fatigue             Anesthesia Plan  ASA Score- 2     Anesthesia Type- IV sedation with anesthesia with ASA Monitors.         Additional Monitors:     Airway Plan:            Plan Factors-    Chart reviewed.   Existing labs reviewed. Patient summary reviewed.                  Induction-     Postoperative Plan-     Informed Consent-

## 2024-04-30 ENCOUNTER — TELEPHONE (OUTPATIENT)
Dept: OTHER | Facility: OTHER | Age: 24
End: 2024-04-30

## 2024-04-30 ENCOUNTER — TELEPHONE (OUTPATIENT)
Age: 24
End: 2024-04-30

## 2024-04-30 NOTE — TELEPHONE ENCOUNTER
Scheduled date of EGD(as of today): 5/13/24  Physician performing EGD: Adebayo  [place]   Instructions reviewed with patient by: office visit   Clearances: n/a

## 2024-05-01 ENCOUNTER — APPOINTMENT (OUTPATIENT)
Dept: LAB | Facility: CLINIC | Age: 24
End: 2024-05-01
Payer: COMMERCIAL

## 2024-05-01 DIAGNOSIS — R10.13 EPIGASTRIC PAIN: ICD-10-CM

## 2024-05-01 DIAGNOSIS — R19.7 DIARRHEA, UNSPECIFIED TYPE: ICD-10-CM

## 2024-05-01 DIAGNOSIS — R14.0 ABDOMINAL BLOATING: ICD-10-CM

## 2024-05-01 LAB — IGA SERPL-MCNC: 330 MG/DL (ref 66–433)

## 2024-05-01 PROCEDURE — 86364 TISS TRNSGLTMNASE EA IG CLAS: CPT

## 2024-05-01 PROCEDURE — 36415 COLL VENOUS BLD VENIPUNCTURE: CPT

## 2024-05-01 PROCEDURE — 82784 ASSAY IGA/IGD/IGG/IGM EACH: CPT

## 2024-05-02 LAB — TTG IGA SER-ACNC: 2 U/ML (ref 0–3)

## 2024-05-06 DIAGNOSIS — R10.13 EPIGASTRIC PAIN: Primary | ICD-10-CM

## 2024-05-08 ENCOUNTER — APPOINTMENT (OUTPATIENT)
Dept: LAB | Facility: CLINIC | Age: 24
End: 2024-05-08
Payer: COMMERCIAL

## 2024-05-08 DIAGNOSIS — R14.0 ABDOMINAL BLOATING: ICD-10-CM

## 2024-05-08 DIAGNOSIS — R19.7 DIARRHEA, UNSPECIFIED TYPE: ICD-10-CM

## 2024-05-08 DIAGNOSIS — R10.13 EPIGASTRIC PAIN: ICD-10-CM

## 2024-05-08 PROCEDURE — 87338 HPYLORI STOOL AG IA: CPT

## 2024-05-09 ENCOUNTER — PREP FOR PROCEDURE (OUTPATIENT)
Dept: GASTROENTEROLOGY | Facility: CLINIC | Age: 24
End: 2024-05-09

## 2024-05-09 ENCOUNTER — TELEPHONE (OUTPATIENT)
Dept: GASTROENTEROLOGY | Facility: CLINIC | Age: 24
End: 2024-05-09

## 2024-05-09 LAB — H PYLORI AG STL QL IA: NEGATIVE

## 2024-05-19 ENCOUNTER — PATIENT MESSAGE (OUTPATIENT)
Dept: FAMILY MEDICINE CLINIC | Facility: CLINIC | Age: 24
End: 2024-05-19

## 2024-05-20 NOTE — PATIENT COMMUNICATION
Lvm notifying pt that Nu requested an appointment for lab works. Also informed pt that she is due for her yearly physical, and insurance covers one a year so there would be no co-pay

## 2024-05-23 ENCOUNTER — TELEPHONE (OUTPATIENT)
Age: 24
End: 2024-05-23

## 2024-05-23 NOTE — TELEPHONE ENCOUNTER
Appointment scheduled with provider.    Reason: Annual Physical    Symptoms: Physical    Provider: KEANU Mauricio    Date/Time: Friday 6/21/2024 at 8:20 am

## 2024-10-23 NOTE — PROGRESS NOTES
Chief Complaint  Pt presented for PPD read; 0 0mm - negative results  ksd,cma      Active Problems    1  Alcohol use disorder (305 00) (F10 99)   2  Chronic fatigue (780 79) (R53 82)   3  Dyshidrotic eczema (705 81) (L30 1)   4  Medication overdose, accidental or unintentional, initial encounter (977 9,E858 9)   (T50 901A)   5  Moderate mood disorder (296 90) (F39)   6  PPD screening test (V74 1) (Z11 1)   7  Primary insomnia (307 42) (F51 01)   8  PTSD (post-traumatic stress disorder) (309 81) (F43 10)   9  Screening for [place] measles (V73 3) (Z11 59)   10  Screening for rubella (V73 3) (Z11 59)    Allergies    1   No Known Drug Allergies    Plan  PPD screening test    · Tubersol 5 UNIT/0 1ML Intradermal Solution    Signatures   Electronically signed by : Jessica Clay DO; Jun 21 0114  3:04PM EST                       (Author) No restrictions

## 2024-10-24 NOTE — PSYCH
History of Present Illness    Presenting Problems: Stressors: PATIENT IS DEPRESSED DUE TO DROPPING OUT OF COLLEGE AND BREAKING UP WITH GIRLFRIEND RECENT PATIENT HAVING HARD TIME GETTING OUT OF BED  Referral Source: STARR HERNANDEZ  She is not employed  She is not a smoker  Symptoms: suicidal ideation, plan: NO PLAN PASSIVE DEATH WISH, no self abusive behaviors, no homicidal thoughts, no history of violence toward others, depressed mood, no anxiety, no psychosis, no medication noncompliance, sleep disturbances, change in appetite, no agitation, no hypomania and POOR CONCENTRATION  Provisional Diagnosis: Axis I: PTSD and Axis II: MOOD DISORDER  Substance Abuse: No substance abuse  Psychiatric Treatment History: NINA ROBIN, Current Psychiatrist: NONE and Therapist: Dasha Delcid   The patient does not require ambulatory assistance  Legal Issues: The patient does not have legal issues  Action: Laboratory work not received  ACCEPTED  Appointment Date: 05/03/2017        Signatures   Electronically signed by : Emanuel Escalona, ; Apr 28 2017  1:52PM EST                       (Author)
supraventricular tachycardia

## 2024-12-23 ENCOUNTER — TELEPHONE (OUTPATIENT)
Age: 24
End: 2024-12-23

## 2024-12-23 NOTE — TELEPHONE ENCOUNTER
Last visit 05/01/2023  No upcoming appt     Patient is asking if she can possibly be accommodated for a virtual appt today. Patient stated that she has had a migraine for the past 3 days and has missed work. Patient stated that she would like to be prescribed imitrex and needs a doctor's excuse. Please contact patient at [phone]. Please advise.

## 2024-12-23 NOTE — TELEPHONE ENCOUNTER
I spoke with pt and advised her that unfortunately we do not have any availability today for an appt and being as though it has been over a year, she would need to be seen in the office. I advised pt if she needed to be seen today to go to UC or ED and pt understood. I offered pt an appointment for tomorrow but she declined.

## 2025-05-27 ENCOUNTER — TELEPHONE (OUTPATIENT)
Dept: FAMILY MEDICINE CLINIC | Facility: CLINIC | Age: 25
End: 2025-05-27

## 2025-07-21 ENCOUNTER — TELEPHONE (OUTPATIENT)
Dept: FAMILY MEDICINE CLINIC | Facility: CLINIC | Age: 25
End: 2025-07-21

## 2025-08-05 ENCOUNTER — OFFICE VISIT (OUTPATIENT)
Dept: URGENT CARE | Facility: MEDICAL CENTER | Age: 25
End: 2025-08-05
Payer: COMMERCIAL

## 2025-08-05 VITALS
DIASTOLIC BLOOD PRESSURE: 86 MMHG | SYSTOLIC BLOOD PRESSURE: 155 MMHG | HEART RATE: 80 BPM | RESPIRATION RATE: 18 BRPM | OXYGEN SATURATION: 98 %

## 2025-08-05 DIAGNOSIS — M54.2 NECK PAIN: Primary | ICD-10-CM

## 2025-08-05 DIAGNOSIS — R51.9 ACUTE INTRACTABLE HEADACHE, UNSPECIFIED HEADACHE TYPE: ICD-10-CM

## 2025-08-05 PROCEDURE — G0382 LEV 3 HOSP TYPE B ED VISIT: HCPCS | Performed by: NURSE PRACTITIONER

## 2025-08-05 RX ORDER — METHOCARBAMOL 500 MG/1
500 TABLET, FILM COATED ORAL 3 TIMES DAILY PRN
Qty: 30 TABLET | Refills: 0 | Status: SHIPPED | OUTPATIENT
Start: 2025-08-05

## 2025-08-05 RX ORDER — PREDNISONE 20 MG/1
40 TABLET ORAL DAILY
Qty: 10 TABLET | Refills: 0 | Status: SHIPPED | OUTPATIENT
Start: 2025-08-05 | End: 2025-08-10